# Patient Record
Sex: MALE | Race: WHITE | HISPANIC OR LATINO | Employment: OTHER | URBAN - METROPOLITAN AREA
[De-identification: names, ages, dates, MRNs, and addresses within clinical notes are randomized per-mention and may not be internally consistent; named-entity substitution may affect disease eponyms.]

---

## 2017-01-11 ENCOUNTER — HOSPITAL ENCOUNTER (INPATIENT)
Facility: HOSPITAL | Age: 82
LOS: 3 days | Discharge: HOSPICE | DRG: 291 | End: 2017-01-14
Attending: EMERGENCY MEDICINE | Admitting: INTERNAL MEDICINE
Payer: MEDICARE

## 2017-01-11 DIAGNOSIS — Z66 DNR (DO NOT RESUSCITATE): ICD-10-CM

## 2017-01-11 DIAGNOSIS — Z71.89 GOALS OF CARE, COUNSELING/DISCUSSION: ICD-10-CM

## 2017-01-11 DIAGNOSIS — J81.0 ACUTE PULMONARY EDEMA: ICD-10-CM

## 2017-01-11 DIAGNOSIS — I50.33 ACUTE ON CHRONIC DIASTOLIC CHF (CONGESTIVE HEART FAILURE): Primary | ICD-10-CM

## 2017-01-11 DIAGNOSIS — F01.518 VASCULAR DEMENTIA WITH BEHAVIOR DISTURBANCE: Chronic | ICD-10-CM

## 2017-01-11 DIAGNOSIS — I48.19 PERSISTENT ATRIAL FIBRILLATION: ICD-10-CM

## 2017-01-11 DIAGNOSIS — R60.0 BILATERAL LEG EDEMA: ICD-10-CM

## 2017-01-11 DIAGNOSIS — R68.0: ICD-10-CM

## 2017-01-11 LAB
ALBUMIN SERPL BCP-MCNC: 3.4 G/DL
ALP SERPL-CCNC: 83 U/L
ALT SERPL W/O P-5'-P-CCNC: 50 U/L
ANION GAP SERPL CALC-SCNC: 10 MMOL/L
AST SERPL-CCNC: 31 U/L
BASOPHILS # BLD AUTO: 0 K/UL
BASOPHILS NFR BLD: 0 %
BILIRUB SERPL-MCNC: 1 MG/DL
BNP SERPL-MCNC: 695 PG/ML
BUN SERPL-MCNC: 26 MG/DL
CALCIUM SERPL-MCNC: 8.8 MG/DL
CHLORIDE SERPL-SCNC: 104 MMOL/L
CO2 SERPL-SCNC: 27 MMOL/L
CREAT SERPL-MCNC: 0.9 MG/DL
DIFFERENTIAL METHOD: ABNORMAL
EOSINOPHIL # BLD AUTO: 0 K/UL
EOSINOPHIL NFR BLD: 0 %
ERYTHROCYTE [DISTWIDTH] IN BLOOD BY AUTOMATED COUNT: 16.7 %
EST. GFR  (AFRICAN AMERICAN): >60 ML/MIN/1.73 M^2
EST. GFR  (NON AFRICAN AMERICAN): >60 ML/MIN/1.73 M^2
GLUCOSE SERPL-MCNC: 151 MG/DL
HCT VFR BLD AUTO: 36.7 %
HGB BLD-MCNC: 11.7 G/DL
INR PPP: 1
LYMPHOCYTES # BLD AUTO: 0.5 K/UL
LYMPHOCYTES NFR BLD: 8 %
MCH RBC QN AUTO: 31.2 PG
MCHC RBC AUTO-ENTMCNC: 31.9 %
MCV RBC AUTO: 98 FL
MONOCYTES # BLD AUTO: 0.5 K/UL
MONOCYTES NFR BLD: 7.4 %
NEUTROPHILS # BLD AUTO: 5.6 K/UL
NEUTROPHILS NFR BLD: 84 %
PLATELET # BLD AUTO: 79 K/UL
PMV BLD AUTO: 10.9 FL
POTASSIUM SERPL-SCNC: 3.9 MMOL/L
PROT SERPL-MCNC: 6.3 G/DL
PROTHROMBIN TIME: 10.9 SEC
RBC # BLD AUTO: 3.75 M/UL
SODIUM SERPL-SCNC: 141 MMOL/L
TROPONIN I SERPL DL<=0.01 NG/ML-MCNC: 0.1 NG/ML
WBC # BLD AUTO: 6.61 K/UL

## 2017-01-11 PROCEDURE — 93005 ELECTROCARDIOGRAM TRACING: CPT

## 2017-01-11 PROCEDURE — 25000003 PHARM REV CODE 250: Performed by: INTERNAL MEDICINE

## 2017-01-11 PROCEDURE — 80053 COMPREHEN METABOLIC PANEL: CPT

## 2017-01-11 PROCEDURE — 51701 INSERT BLADDER CATHETER: CPT

## 2017-01-11 PROCEDURE — 84484 ASSAY OF TROPONIN QUANT: CPT

## 2017-01-11 PROCEDURE — 83880 ASSAY OF NATRIURETIC PEPTIDE: CPT

## 2017-01-11 PROCEDURE — 25000003 PHARM REV CODE 250: Performed by: EMERGENCY MEDICINE

## 2017-01-11 PROCEDURE — 87040 BLOOD CULTURE FOR BACTERIA: CPT | Mod: 59

## 2017-01-11 PROCEDURE — 63600175 PHARM REV CODE 636 W HCPCS: Performed by: INTERNAL MEDICINE

## 2017-01-11 PROCEDURE — 93010 ELECTROCARDIOGRAM REPORT: CPT | Mod: 76,,, | Performed by: INTERNAL MEDICINE

## 2017-01-11 PROCEDURE — 94760 N-INVAS EAR/PLS OXIMETRY 1: CPT

## 2017-01-11 PROCEDURE — 11000001 HC ACUTE MED/SURG PRIVATE ROOM

## 2017-01-11 PROCEDURE — 99291 CRITICAL CARE FIRST HOUR: CPT | Mod: ,,, | Performed by: EMERGENCY MEDICINE

## 2017-01-11 PROCEDURE — 99291 CRITICAL CARE FIRST HOUR: CPT | Mod: 25

## 2017-01-11 PROCEDURE — 94640 AIRWAY INHALATION TREATMENT: CPT

## 2017-01-11 PROCEDURE — 85610 PROTHROMBIN TIME: CPT

## 2017-01-11 PROCEDURE — 85025 COMPLETE CBC W/AUTO DIFF WBC: CPT

## 2017-01-11 PROCEDURE — 63600175 PHARM REV CODE 636 W HCPCS: Performed by: EMERGENCY MEDICINE

## 2017-01-11 PROCEDURE — 93010 ELECTROCARDIOGRAM REPORT: CPT | Mod: ,,, | Performed by: INTERNAL MEDICINE

## 2017-01-11 PROCEDURE — 99223 1ST HOSP IP/OBS HIGH 75: CPT | Mod: ,,, | Performed by: INTERNAL MEDICINE

## 2017-01-11 PROCEDURE — 96374 THER/PROPH/DIAG INJ IV PUSH: CPT

## 2017-01-11 RX ORDER — IPRATROPIUM BROMIDE 0.5 MG/2.5ML
0.5 SOLUTION RESPIRATORY (INHALATION) EVERY 8 HOURS
Status: DISCONTINUED | OUTPATIENT
Start: 2017-01-11 | End: 2017-01-16 | Stop reason: HOSPADM

## 2017-01-11 RX ORDER — IPRATROPIUM BROMIDE AND ALBUTEROL SULFATE 2.5; .5 MG/3ML; MG/3ML
3 SOLUTION RESPIRATORY (INHALATION) EVERY 4 HOURS
Status: DISCONTINUED | OUTPATIENT
Start: 2017-01-11 | End: 2017-01-11

## 2017-01-11 RX ORDER — HYDROCODONE BITARTRATE AND ACETAMINOPHEN 7.5; 325 MG/1; MG/1
1 TABLET ORAL EVERY 4 HOURS PRN
Status: DISCONTINUED | OUTPATIENT
Start: 2017-01-11 | End: 2017-01-16 | Stop reason: HOSPADM

## 2017-01-11 RX ORDER — FUROSEMIDE 10 MG/ML
60 INJECTION INTRAMUSCULAR; INTRAVENOUS
Status: COMPLETED | OUTPATIENT
Start: 2017-01-11 | End: 2017-01-11

## 2017-01-11 RX ORDER — SODIUM CHLORIDE 0.9 % (FLUSH) 0.9 %
3 SYRINGE (ML) INJECTION EVERY 8 HOURS
Status: DISCONTINUED | OUTPATIENT
Start: 2017-01-11 | End: 2017-01-16 | Stop reason: HOSPADM

## 2017-01-11 RX ORDER — LEVALBUTEROL INHALATION SOLUTION 0.63 MG/3ML
0.63 SOLUTION RESPIRATORY (INHALATION) EVERY 8 HOURS
Status: DISCONTINUED | OUTPATIENT
Start: 2017-01-11 | End: 2017-01-16 | Stop reason: HOSPADM

## 2017-01-11 RX ORDER — FUROSEMIDE 10 MG/ML
40 INJECTION INTRAMUSCULAR; INTRAVENOUS 2 TIMES DAILY
Status: DISCONTINUED | OUTPATIENT
Start: 2017-01-11 | End: 2017-01-12

## 2017-01-11 RX ORDER — PANTOPRAZOLE SODIUM 40 MG/1
40 TABLET, DELAYED RELEASE ORAL
Status: DISCONTINUED | OUTPATIENT
Start: 2017-01-11 | End: 2017-01-13

## 2017-01-11 RX ORDER — ENOXAPARIN SODIUM 100 MG/ML
40 INJECTION SUBCUTANEOUS EVERY 24 HOURS
Status: DISCONTINUED | OUTPATIENT
Start: 2017-01-12 | End: 2017-01-16 | Stop reason: HOSPADM

## 2017-01-11 RX ORDER — DONEPEZIL HYDROCHLORIDE 5 MG/1
10 TABLET, FILM COATED ORAL NIGHTLY
Status: DISCONTINUED | OUTPATIENT
Start: 2017-01-11 | End: 2017-01-16 | Stop reason: HOSPADM

## 2017-01-11 RX ORDER — NAPROXEN SODIUM 220 MG/1
81 TABLET, FILM COATED ORAL DAILY
Status: DISCONTINUED | OUTPATIENT
Start: 2017-01-12 | End: 2017-01-16 | Stop reason: HOSPADM

## 2017-01-11 RX ORDER — NITROGLYCERIN 0.4 MG/1
0.4 TABLET SUBLINGUAL ONCE
Status: COMPLETED | OUTPATIENT
Start: 2017-01-11 | End: 2017-01-11

## 2017-01-11 RX ORDER — CARVEDILOL 3.12 MG/1
6.25 TABLET ORAL 2 TIMES DAILY
Status: DISCONTINUED | OUTPATIENT
Start: 2017-01-11 | End: 2017-01-12

## 2017-01-11 RX ADMIN — MICONAZOLE NITRATE: 20 OINTMENT TOPICAL at 10:01

## 2017-01-11 RX ADMIN — LEVALBUTEROL HYDROCHLORIDE 0.63 MG: 0.63 SOLUTION RESPIRATORY (INHALATION) at 07:01

## 2017-01-11 RX ADMIN — IPRATROPIUM BROMIDE 0.5 MG: 0.5 SOLUTION RESPIRATORY (INHALATION) at 07:01

## 2017-01-11 RX ADMIN — PANTOPRAZOLE SODIUM 40 MG: 40 TABLET, DELAYED RELEASE ORAL at 07:01

## 2017-01-11 RX ADMIN — NITROGLYCERIN 0.4 MG: 0.4 TABLET SUBLINGUAL at 07:01

## 2017-01-11 RX ADMIN — FUROSEMIDE 40 MG: 10 INJECTION, SOLUTION INTRAVENOUS at 10:01

## 2017-01-11 RX ADMIN — FUROSEMIDE 60 MG: 10 INJECTION, SOLUTION INTRAMUSCULAR; INTRAVENOUS at 04:01

## 2017-01-11 RX ADMIN — DONEPEZIL HYDROCHLORIDE 10 MG: 5 TABLET, FILM COATED ORAL at 10:01

## 2017-01-11 RX ADMIN — Medication 3 ML: at 10:01

## 2017-01-11 RX ADMIN — CARVEDILOL 6.25 MG: 3.12 TABLET, FILM COATED ORAL at 07:01

## 2017-01-11 NOTE — ED PROVIDER NOTES
Encounter Date: 1/11/2017    SCRIBE #1 NOTE: I, Kassi Felton, am scribing for, and in the presence of, Dr. Raya.       History     Chief Complaint   Patient presents with    edema     edema to bilateral legs with weeping; from [facility]; hx CHF; rales noted upon auscultation; initial sats 90%on RA     Review of patient's allergies indicates:   Allergen Reactions    Pcn [penicillins] Hives and Other (See Comments)     unknow     HPI Comments: Time patient was seen by the provider: 3:32 PM      The patient is a 89 y.o. male with hx of: HTN, prostate cancer that presents to the ED with a complaint of AMS and edema. Pt with extremity swelling and cough. Pt speaks limited English and is confused making history difficult in spite of taking the history with a .  He was sent here from the nursing home for increased lower extremity edema as well as some confusion.  A review of his medical record shows that the confusion is a similar presentation to his previous admission.        The history is provided by the patient and medical records. The history is limited by a language barrier and the condition of the patient. A  was used.     Past Medical History   Diagnosis Date    Basal cell carcinoma 04/20/2016     Scalp    DNR (do not resuscitate)     Hypertension     Hypothyroidism     Normocytic anemia 11/12/2016    Prostate cancer      Past Medical History Pertinent Negatives   Diagnosis Date Noted    Asthma 8/27/2012    Chronic kidney disease 8/27/2012    Clotting disorder 8/27/2012    Colon polyp 8/27/2012    COPD (chronic obstructive pulmonary disease) 8/27/2012    Coronary artery disease 8/27/2012    Diabetes mellitus 8/27/2012    Elevated PSA 8/27/2012    Glaucoma 8/27/2012    Kidney stone 8/27/2012    Liver disease 8/27/2012    Stroke 8/27/2012    Ulcer 8/27/2012    Urinary tract infection 8/27/2012     Past Surgical History   Procedure Laterality  Date    Prostate surgery      Prostatectomy      Hernia repair       X 2    Cataract extraction, bilateral      Skin cancer excision       EAR RIGHT, LEFT JAW, AND RIGHT ARM    Back surgery       COCCYX AS  A CHILD     Family History   Problem Relation Age of Onset    Skin cancer Father     Stroke Sister 89    Anesthesia problems Neg Hx     Cancer Neg Hx     Clotting disorder Neg Hx      problems Neg Hx     Heart disease Neg Hx     Hypertension Neg Hx     Kidney cancer Neg Hx     Kidney disease Neg Hx     Malignant hyperthermia Neg Hx     Prostate cancer Neg Hx     Sickle cell trait Neg Hx     Lupus Neg Hx     Sudden death Neg Hx     Urolithiasis Neg Hx     Melanoma Neg Hx      Social History   Substance Use Topics    Smoking status: Never Smoker    Smokeless tobacco: Never Used    Alcohol use No     Review of Systems   Unable to perform ROS: Mental status change   Respiratory: Positive for cough.    Cardiovascular: Positive for leg swelling (+upper extremities).       Physical Exam   Initial Vitals   BP Pulse Resp Temp SpO2   01/11/17 1448 01/11/17 1448 01/11/17 1448 01/11/17 1448 01/11/17 1448   156/80 75 20 97.1 °F (36.2 °C) 100 %     Physical Exam    Nursing note and vitals reviewed.  Constitutional: He appears well-developed. No distress.   Audible coarse BS. Wet sounding cough. Moderate respiratory      HENT:   Mouth/Throat: Oropharynx is clear and moist.   Eyes: EOM are normal. Pupils are equal, round, and reactive to light.   Neck: Normal range of motion. Neck supple. JVD (to the angle of the mandible) present.   Cardiovascular:   Irregularly irregular   Pulmonary/Chest: No stridor. He is in respiratory distress (moderate).   Coarse expiratory rales 3/4 of the way up bilaterally   Abdominal: Soft. There is no tenderness.   Musculoskeletal:   4+ pitting edema to the thighs bilaterally. RLE is wrapped in gauze and there is some weeping of serous fluid through the gauze. LUE edema    Neurological:   Pt appears confused, had difficulty answering in linear fashion and follows some commands. Pt at times speaking  Syriac and English, though we attempted to obtain hx through a . At one point he appeared to be speaking about poetry   Skin: Skin is warm.         ED Course   Procedures  Labs Reviewed   CBC W/ AUTO DIFFERENTIAL - Abnormal; Notable for the following:        Result Value    RBC 3.75 (*)     Hemoglobin 11.7 (*)     Hematocrit 36.7 (*)     MCH 31.2 (*)     MCHC 31.9 (*)     RDW 16.7 (*)     Platelets 79 (*)     Lymph # 0.5 (*)     Gran% 84.0 (*)     Lymph% 8.0 (*)     All other components within normal limits   COMPREHENSIVE METABOLIC PANEL - Abnormal; Notable for the following:     Glucose 151 (*)     Albumin 3.4 (*)     ALT 50 (*)     All other components within normal limits   TROPONIN I - Abnormal; Notable for the following:     Troponin I 0.097 (*)     All other components within normal limits   B-TYPE NATRIURETIC PEPTIDE - Abnormal; Notable for the following:      (*)     All other components within normal limits   CULTURE, BLOOD   CULTURE, BLOOD   PROTIME-INR     EKG Readings: (Independently Interpreted)   afib at 71. RBBB. No significant change when compared to EKG from December 8,2016       X-Rays:   Independently Interpreted Readings:   Chest X-Ray: Cardiomegaly. Bibasilar infiltrates vs pulmonary edema     Medical Decision Making:   History:   Old Medical Records: I decided to obtain old medical records.  Old Records Summarized: records from previous admission(s).       <> Summary of Records: Pt discharged after he was admitted 12/8-12/12 for PNA and presented with AMS. At that time he also had acute on chronic diastolic CHF. Pt was treated with IV lasix, ceftriaxone and azithromycin.  Initial Assessment:   The pt is a 89 y.o.  male  that presents from nursing home for AMS and swelling. Pt clinically appears volume overloaded. Recent admission for PNA. My  initial differential diagnoses include, but are not limited to: Pulmonary edema, CHF exacerbation, PNA. Will obtain labs, blood cultures, CXR. Plan for admission to the hospital.            Scribe Attestation:   Scribe #1: I performed the above scribed service and the documentation accurately describes the services I performed. I attest to the accuracy of the note.    Attending Attestation:         Attending Critical Care:   Critical Care Times:   Direct Patient Care (initial evaluation, reassessments, and time considering the case)................................................................20 minutes.   Additional History from reviewing old medical records or taking additional history from the family, EMS, PCP, etc.......................8 minutes.   Ordering, Reviewing, and Interpreting Diagnostic Studies...............................................................................................................5 minutes.   Documentation..................................................................................................................................................................................5 minutes.   Consultation with other Physicians. .................................................................................................................................................5 minutes.   Consultation with the patient's family directly relating to the patient's condition, care, and DNR status (when patient unable)......5 minutes.   ==============================================================  · Total Critical Care Time - exclusive of procedural time: 48 minutes.  ==============================================================  Critical Care Condition: potentially life-threatening   Critical Care Comments: Critical care time required in this patient who presented in respiratory distress from CHF; patient had the potential for deterioration in his condition at any time.     Physician  Attestation for Scribe:  Physician Attestation Statement for Scribe #1: I, Dr. Raya, reviewed documentation, as scribed by Kassi Felton in my presence, and it is both accurate and complete.         Attending ED Notes:   4:30 PM  I attempted to contact pt's emergency contact (daughter Irene) to gather more information/ inquire about code status, however she did not answer and her voicemail was full. Will attempt to contact nursing home.    4:40 PM  Per nursing home, pt alternates between English and Tajik at baseline and is usually alert and oriented. Nursing home states that pt has DNR order on his chart that was signed in November 2016.     4:55 PM  I spoke with the pt's daughter (Irene Marquez [phone]). She confirmed the pt's DNR status.    6:15 PM  Discussed case with [facility], Dr. Zhou, and patient will be admitted to Dr. Yanes's service.  Patient is breathing more easily now.  He was given 60 mg of Lasix IV.  Condom catheter in place.            ED Course     Clinical Impression:   The primary encounter diagnosis was Acute pulmonary edema. A diagnosis of Acute on chronic diastolic CHF (congestive heart failure) was also pertinent to this visit.          Nati Romano MD  01/11/17 1817       Nati Romano MD  01/11/17 1829

## 2017-01-11 NOTE — ED TRIAGE NOTES
Pt arrives via ems for ams and sob. Pt aao to self and place. Pt in mild resp distress. Pt has weeping edema noted to ble and edema to hands. Pt has no other complaints. Denies cough, chest pain.

## 2017-01-11 NOTE — ED NOTES
Pt resting comfortably and independently repositioned in stretcher with bed locked in lowest position for safety. NAD noted at this time. Respirations even and unlabored and visible chest rise noted.  Pt instructed to call if assistance is needed. Pt on continuous cardiac, BP, and O2 monitoring. Call light within reach. Family at bedside. No needs at this time. Will continue to monitor.

## 2017-01-11 NOTE — ED NOTES
Patient identifiers verified and correct for Hardik Marquez.    LOC: The patient is awake, alert and aware of environment with an appropriate affect, the patient is oriented x 2 and speaking appropriately.  APPEARANCE: Patient resting comfortably and in no acute distress, patient is clean and well groomed, patient's clothing is properly fastened.  SKIN: The skin is warm and dry, color consistent with ethnicity, patient has normal skin turgor and moist mucus membranes, skin intact, no breakdown or bruising noted.  MUSCULOSKELETAL: Patient moving all extremities spontaneously, no obvious swelling or deformities noted.  RESPIRATORY: Airway is open and patent, respirations are spontaneous, patient tachypneic, some accessory muscle use noted, rales bilateral breath sounds.  CARDIAC: Patient has a normal rate and regular rhythm, weeping periphreal edema noted to ble, capillary refill < 3 seconds.  ABDOMEN: Soft and non tender to palpation, no distention noted, normoactive bowel sounds present in all four quadrants.  NEUROLOGIC: PERRL, 3mm bilaterally, eyes open spontaneously, behavior appropriate to situation, follows commands, facial expression symmetrical, bilateral hand grasp equal and even, purposeful motor response noted, normal sensation in all extremities when touched with a finger.

## 2017-01-12 LAB
ALBUMIN SERPL BCP-MCNC: 3.1 G/DL
ALLENS TEST: ABNORMAL
ALP SERPL-CCNC: 75 U/L
ALT SERPL W/O P-5'-P-CCNC: 44 U/L
ANION GAP SERPL CALC-SCNC: 12 MMOL/L
AST SERPL-CCNC: 27 U/L
BASOPHILS # BLD AUTO: 0.01 K/UL
BASOPHILS NFR BLD: 0.2 %
BILIRUB SERPL-MCNC: 1 MG/DL
BUN SERPL-MCNC: 24 MG/DL
CALCIUM SERPL-MCNC: 8.5 MG/DL
CHLORIDE SERPL-SCNC: 100 MMOL/L
CO2 SERPL-SCNC: 33 MMOL/L
CREAT SERPL-MCNC: 0.8 MG/DL
DELSYS: ABNORMAL
DIFFERENTIAL METHOD: ABNORMAL
EOSINOPHIL # BLD AUTO: 0 K/UL
EOSINOPHIL NFR BLD: 0 %
EP: 5
ERYTHROCYTE [DISTWIDTH] IN BLOOD BY AUTOMATED COUNT: 16.6 %
ERYTHROCYTE [SEDIMENTATION RATE] IN BLOOD BY WESTERGREN METHOD: 19 MM/H
EST. GFR  (AFRICAN AMERICAN): >60 ML/MIN/1.73 M^2
EST. GFR  (NON AFRICAN AMERICAN): >60 ML/MIN/1.73 M^2
FIO2: 35
GLUCOSE SERPL-MCNC: 133 MG/DL
HCO3 UR-SCNC: 36.9 MMOL/L (ref 24–28)
HCT VFR BLD AUTO: 36.3 %
HGB BLD-MCNC: 11.3 G/DL
IP: 12
LACTATE SERPL-SCNC: 1 MMOL/L
LYMPHOCYTES # BLD AUTO: 0.5 K/UL
LYMPHOCYTES NFR BLD: 8.1 %
MAGNESIUM SERPL-MCNC: 1.9 MG/DL
MCH RBC QN AUTO: 30.7 PG
MCHC RBC AUTO-ENTMCNC: 31.1 %
MCV RBC AUTO: 99 FL
MIN VOL: 6
MODE: ABNORMAL
MONOCYTES # BLD AUTO: 0.4 K/UL
MONOCYTES NFR BLD: 6.5 %
NEUTROPHILS # BLD AUTO: 5.3 K/UL
NEUTROPHILS NFR BLD: 84.7 %
PCO2 BLDA: 71.9 MMHG (ref 35–45)
PH SMN: 7.32 [PH] (ref 7.35–7.45)
PLATELET # BLD AUTO: 76 K/UL
PMV BLD AUTO: 11 FL
PO2 BLDA: 134 MMHG (ref 80–100)
POC BE: 11 MMOL/L
POC SATURATED O2: 99 % (ref 95–100)
POC TCO2: 39 MMOL/L (ref 23–27)
POTASSIUM SERPL-SCNC: 3.4 MMOL/L
PROCALCITONIN SERPL IA-MCNC: <0.09 NG/ML
PROT SERPL-MCNC: 5.8 G/DL
RBC # BLD AUTO: 3.68 M/UL
SAMPLE: ABNORMAL
SITE: ABNORMAL
SODIUM SERPL-SCNC: 145 MMOL/L
SP02: 99
SPONT RATE: 9
TROPONIN I SERPL DL<=0.01 NG/ML-MCNC: 0.1 NG/ML
WBC # BLD AUTO: 6.3 K/UL

## 2017-01-12 PROCEDURE — 93005 ELECTROCARDIOGRAM TRACING: CPT

## 2017-01-12 PROCEDURE — 63600175 PHARM REV CODE 636 W HCPCS: Performed by: CLINICAL NURSE SPECIALIST

## 2017-01-12 PROCEDURE — 87040 BLOOD CULTURE FOR BACTERIA: CPT | Mod: 59

## 2017-01-12 PROCEDURE — 36600 WITHDRAWAL OF ARTERIAL BLOOD: CPT

## 2017-01-12 PROCEDURE — 27000221 HC OXYGEN, UP TO 24 HOURS

## 2017-01-12 PROCEDURE — 94640 AIRWAY INHALATION TREATMENT: CPT

## 2017-01-12 PROCEDURE — 84145 PROCALCITONIN (PCT): CPT

## 2017-01-12 PROCEDURE — 84484 ASSAY OF TROPONIN QUANT: CPT

## 2017-01-12 PROCEDURE — 80053 COMPREHEN METABOLIC PANEL: CPT

## 2017-01-12 PROCEDURE — 63600175 PHARM REV CODE 636 W HCPCS: Performed by: INTERNAL MEDICINE

## 2017-01-12 PROCEDURE — 11000001 HC ACUTE MED/SURG PRIVATE ROOM

## 2017-01-12 PROCEDURE — 94761 N-INVAS EAR/PLS OXIMETRY MLT: CPT

## 2017-01-12 PROCEDURE — 36415 COLL VENOUS BLD VENIPUNCTURE: CPT

## 2017-01-12 PROCEDURE — 99233 SBSQ HOSP IP/OBS HIGH 50: CPT | Mod: ,,, | Performed by: NURSE PRACTITIONER

## 2017-01-12 PROCEDURE — 83605 ASSAY OF LACTIC ACID: CPT

## 2017-01-12 PROCEDURE — 93010 ELECTROCARDIOGRAM REPORT: CPT | Mod: ,,, | Performed by: INTERNAL MEDICINE

## 2017-01-12 PROCEDURE — 94660 CPAP INITIATION&MGMT: CPT

## 2017-01-12 PROCEDURE — 85025 COMPLETE CBC W/AUTO DIFF WBC: CPT

## 2017-01-12 PROCEDURE — 25000003 PHARM REV CODE 250: Performed by: NURSE PRACTITIONER

## 2017-01-12 PROCEDURE — 25000003 PHARM REV CODE 250: Performed by: INTERNAL MEDICINE

## 2017-01-12 PROCEDURE — 82803 BLOOD GASES ANY COMBINATION: CPT

## 2017-01-12 PROCEDURE — 63600175 PHARM REV CODE 636 W HCPCS: Performed by: NURSE PRACTITIONER

## 2017-01-12 PROCEDURE — 83735 ASSAY OF MAGNESIUM: CPT

## 2017-01-12 PROCEDURE — 94760 N-INVAS EAR/PLS OXIMETRY 1: CPT

## 2017-01-12 RX ORDER — FUROSEMIDE 10 MG/ML
60 INJECTION INTRAMUSCULAR; INTRAVENOUS ONCE
Status: COMPLETED | OUTPATIENT
Start: 2017-01-12 | End: 2017-01-12

## 2017-01-12 RX ORDER — FUROSEMIDE 10 MG/ML
60 INJECTION INTRAMUSCULAR; INTRAVENOUS 3 TIMES DAILY
Status: DISCONTINUED | OUTPATIENT
Start: 2017-01-12 | End: 2017-01-16

## 2017-01-12 RX ORDER — CARVEDILOL 3.12 MG/1
3.12 TABLET ORAL 2 TIMES DAILY
Status: DISCONTINUED | OUTPATIENT
Start: 2017-01-12 | End: 2017-01-12

## 2017-01-12 RX ORDER — METRONIDAZOLE 500 MG/100ML
500 INJECTION, SOLUTION INTRAVENOUS
Status: DISCONTINUED | OUTPATIENT
Start: 2017-01-12 | End: 2017-01-16 | Stop reason: HOSPADM

## 2017-01-12 RX ORDER — FUROSEMIDE 10 MG/ML
40 INJECTION INTRAMUSCULAR; INTRAVENOUS 3 TIMES DAILY
Status: DISCONTINUED | OUTPATIENT
Start: 2017-01-12 | End: 2017-01-12

## 2017-01-12 RX ORDER — POTASSIUM CHLORIDE 20 MEQ/15ML
60 SOLUTION ORAL DAILY
Status: DISCONTINUED | OUTPATIENT
Start: 2017-01-12 | End: 2017-01-13

## 2017-01-12 RX ADMIN — IPRATROPIUM BROMIDE 0.5 MG: 0.5 SOLUTION RESPIRATORY (INHALATION) at 12:01

## 2017-01-12 RX ADMIN — LEVALBUTEROL HYDROCHLORIDE 0.63 MG: 0.63 SOLUTION RESPIRATORY (INHALATION) at 11:01

## 2017-01-12 RX ADMIN — ENOXAPARIN SODIUM 40 MG: 100 INJECTION SUBCUTANEOUS at 12:01

## 2017-01-12 RX ADMIN — VANCOMYCIN HYDROCHLORIDE 1000 MG: 1 INJECTION, POWDER, LYOPHILIZED, FOR SOLUTION INTRAVENOUS at 03:01

## 2017-01-12 RX ADMIN — FUROSEMIDE 60 MG: 10 INJECTION, SOLUTION INTRAVENOUS at 09:01

## 2017-01-12 RX ADMIN — CEFTRIAXONE 1 G: 1 INJECTION, SOLUTION INTRAVENOUS at 01:01

## 2017-01-12 RX ADMIN — FUROSEMIDE 40 MG: 10 INJECTION, SOLUTION INTRAVENOUS at 12:01

## 2017-01-12 RX ADMIN — LEVALBUTEROL HYDROCHLORIDE 0.63 MG: 0.63 SOLUTION RESPIRATORY (INHALATION) at 09:01

## 2017-01-12 RX ADMIN — Medication 3 ML: at 06:01

## 2017-01-12 RX ADMIN — LEVALBUTEROL HYDROCHLORIDE 0.63 MG: 0.63 SOLUTION RESPIRATORY (INHALATION) at 05:01

## 2017-01-12 RX ADMIN — MICONAZOLE NITRATE: 20 OINTMENT TOPICAL at 09:01

## 2017-01-12 RX ADMIN — Medication 3 ML: at 03:01

## 2017-01-12 RX ADMIN — METRONIDAZOLE 500 MG: 500 INJECTION, SOLUTION INTRAVENOUS at 02:01

## 2017-01-12 RX ADMIN — IPRATROPIUM BROMIDE 0.5 MG: 0.5 SOLUTION RESPIRATORY (INHALATION) at 11:01

## 2017-01-12 RX ADMIN — IPRATROPIUM BROMIDE 0.5 MG: 0.5 SOLUTION RESPIRATORY (INHALATION) at 05:01

## 2017-01-12 RX ADMIN — IPRATROPIUM BROMIDE 0.5 MG: 0.5 SOLUTION RESPIRATORY (INHALATION) at 09:01

## 2017-01-12 RX ADMIN — Medication 3 ML: at 10:01

## 2017-01-12 RX ADMIN — METRONIDAZOLE 500 MG: 500 INJECTION, SOLUTION INTRAVENOUS at 08:01

## 2017-01-12 RX ADMIN — MICONAZOLE NITRATE: 20 OINTMENT TOPICAL at 01:01

## 2017-01-12 NOTE — PROGRESS NOTES
Pt is resident of [facility]. Not appropriate for Digital Medicine HF program.    Removed from HF list.

## 2017-01-12 NOTE — TREATMENT PLAN
Occupational Therapy   Pt Not Seen    Hardik Marquez  MRN: [id number]  Room/Bed: 1102/1102 B    Pt not seen by OT this AM secondary to nurse requesting therapy hold on seeing Pt  Due   to CORE being called on Pt this AM.  Will check back at later time to see if Pt is appropriate   To Eval.    Jonnie Burks, OTR/L  1/12/2017

## 2017-01-12 NOTE — SIGNIFICANT EVENT
Critical Care Outreach (CORE)  Critical Care Medicine  Consult Note      CORE Metrics:     Admit Date: 2017  LOS: 1  Code Status: DNR   CC:  Respiratory Failure  Date of Consult: 2017  : 1923  Age: 89 y.o.  Weight:   Wt Readings from Last 1 Encounters:   17 66 kg (145 lb 8.1 oz)     Race:   Sex: male  Bed: 1102/1102 B:   MRN: [id number]  RRT Indication(s):  Respiratory Distress  Time CORE Call Received:  08:50  Time CORE at Bedside: 08:55  Was the patient discharged from an ICU this admission? N  Was the patient discharged from a PACU within last 24 hours? N  Did the patient receive conscious sedation/general anesthesia within last 24 hours? N  Was the patient in the ED within the past 24 hours? Y  Was the patient started on NIPPV within the past 24 hours? Y  Did this progress into an ARC or CPA: N  Attending Physician: Jarett Yanes MD  Primary Service: Networked reference to record PCT   Illness Category: Medical Cardiac  Consult Requested By: Jarett Yanes MD   Disposition:  Stabilized on the floor    SUBJECTIVE:     History of Present Illness:  Mr Marquez is a 89 y.o. male with a PMH of HFpEF, HTN, and hypothyroidism presented to ED from his Nursing Home on 2017 with bilateral lower extremity edema and respiratory distress. He was treated with Lasix with good diuresis.    Today a Rapid Response call was initiated for respiratory distress. On arrival to the room the patient was lethargic, tachypneic, hypothermic, and bradycardic.       Past Medical History   Diagnosis Date    Basal cell carcinoma 2016     Scalp    DNR (do not resuscitate)     Hypertension     Hypothyroidism     Normocytic anemia 2016    Prostate cancer       Past Surgical History   Procedure Laterality Date    Prostate surgery      Prostatectomy      Hernia repair       X 2    Cataract extraction, bilateral      Skin cancer excision       EAR RIGHT, LEFT JAW, AND RIGHT ARM     Back surgery       COCCYX AS  A CHILD      Review of patient's allergies indicates:   Allergen Reactions    Pcn [penicillins] Hives and Other (See Comments)     unknow       Family History   Problem Relation Age of Onset    Skin cancer Father     Stroke Sister 93    Anesthesia problems Neg Hx     Cancer Neg Hx     Clotting disorder Neg Hx      problems Neg Hx     Heart disease Neg Hx     Hypertension Neg Hx     Kidney cancer Neg Hx     Kidney disease Neg Hx     Malignant hyperthermia Neg Hx     Prostate cancer Neg Hx     Sickle cell trait Neg Hx     Lupus Neg Hx     Sudden death Neg Hx     Urolithiasis Neg Hx     Melanoma Neg Hx       Social History   Substance Use Topics    Smoking status: Never Smoker    Smokeless tobacco: Never Used    Alcohol use No         Inpatient Medications:  Continuous Infusions:    Scheduled Meds:   aspirin  81 mg Oral Daily    carvedilol  3.125 mg Oral BID    donepezil  10 mg Oral QHS    enoxaparin  40 mg Subcutaneous Daily    furosemide  40 mg Intravenous TID    ipratropium  0.5 mg Nebulization Q8H    levalbuterol  0.63 mg Nebulization Q8H    levothyroxine  125 mcg Oral Daily    miconazole nitrate 2%   Topical (Top) BID    pantoprazole  40 mg Oral BID AC    potassium chloride 10%  60 mEq Oral Daily    sodium chloride 0.9%  3 mL Intravenous Q8H      PRN Meds:.hydrocodone-acetaminophen 7.5-325mg     Review of Systems: RITCHIE due to AMS    OBJECTIVE:     Vital Signs (Most Recent):  Temp: (!) 93.7 °F (34.3 °C) (01/12/17 0844)  Pulse: (!) 58 (01/12/17 0819)  Resp: 18 (01/12/17 0819)  BP: (!) 169/99 (01/12/17 0819)  SpO2: 100 % (01/12/17 0819) Vital Signs (24h Range):  Temp:  [93.1 °F (33.9 °C)-98 °F (36.7 °C)] 93.7 °F (34.3 °C)  Pulse:  [46-75] 58  Resp:  [18-35] 18  BP: (116-169)/(60-99) 169/99     I & O (24h):    Intake/Output Summary (Last 24 hours) at 01/12/17 0909  Last data filed at 01/12/17 0400   Gross per 24 hour   Intake                0 ml    Output             2800 ml   Net            -2800 ml        Physical Exam:  GA: Acute respiratory distress   HEENT: No visible oropharyngeal abnormalities.   Pulmonary: Crackles, wheezing, and  rhonchi bilaterally  Cardiac: Irregular, bradycardic.   Abdominal: Bowel sounds present. Abdomen soft  Skin: No jaundice, rashes, or visible lesions.  Musculoskeletal: Moves all extremities   Neuro:  --GCS: E3 V4 M6  --Mental Status:  Listless  --RASS: 0 to -1  --CAM-ICU: RITCHIE  --Pupils 3mm, PERRL.    Lines/Drains/Airway:          External Urinary Catheter 01/11/17 1600 (Active)   Collection Container Standard drainage bag 1/11/2017 10:50 PM   Securement Method secured to upper leg w/ adhesive device 1/11/2017 10:50 PM   Skin no redness;no breakdown 1/11/2017 10:50 PM   Tolerance no signs/symptoms of discomfort 1/11/2017 10:50 PM   Output (mL) 2800 mL 1/12/2017  4:00 AM        Nutrition:  Current Diet Order   Procedures    Diet NPO         Labs:  CBC/Anemia Profile:     Recent Labs  Lab 01/11/17  1550 01/12/17  0447   WBC 6.61 6.30   HGB 11.7* 11.3*   HCT 36.7* 36.3*   PLT 79* 76*   MCV 98 99*   RDW 16.7* 16.6*        Coags:     Recent Labs  Lab 01/11/17  1550   INR 1.0        Chemistries:     Recent Labs  Lab 01/11/17  1550 01/12/17  0447    145   K 3.9 3.4*    100   CO2 27 33*   BUN 26 24   CREATININE 0.9 0.8   CALCIUM 8.8 8.5*   PROT 6.3 5.8*   BILITOT 1.0 1.0   ALKPHOS 83 75   ALT 50* 44   AST 31 27   MG  --  1.9        Urine Studies:   Lab Results   Component Value Date    COLORU Yellow 12/08/2016    APPEARANCEUA Clear 12/08/2016    PHUR 5.0 12/08/2016    SPECGRAV 1.010 12/08/2016    PROTEINUA Negative 12/08/2016    GLUCUA Negative 12/08/2016    KETONESU Negative 12/08/2016    BILIRUBINUA Negative 12/08/2016    OCCULTUA Negative 12/08/2016    NITRITE Negative 12/08/2016    UROBILINOGEN Negative 12/08/2016    LEUKOCYTESUR Negative 12/08/2016    RBCUA 2 01/29/2015    WBCUA 1 01/29/2015    BACTERIA Rare  01/29/2015    SQUAMEPITHEL <1 03/24/2011    HYALINECASTS 1 01/29/2015        Lactic Acid:   Lab Results   Component Value Date    LACTATE 1.1 12/08/2016    LACTATE 1.8 11/11/2016        Cardiac Enzymes:   Recent Labs      01/11/17   1550   TROPONINI  0.097*        Arterial Blood Gas:   No results for input(s): PH, PCO2, HCO3, POCSATURATED, BE in the last 24 hours.    Invalid input(s):  PO2     Microbiology Results (last 7 days)     Procedure Component Value Units Date/Time    Blood Culture #1 **CANNOT BE ORDERED STAT** [[id number]] Collected:  01/11/17 1545    Order Status:  Completed Specimen:  Blood from Peripheral, Forearm, Right Updated:  01/12/17 0315     Blood Culture, Routine No Growth to date    Blood Culture #2 **CANNOT BE ORDERED STAT** [[id number]] Collected:  01/11/17 1550    Order Status:  Completed Specimen:  Blood from Peripheral, Wrist, Left Updated:  01/12/17 0315     Blood Culture, Routine No Growth to date          ASSESSMENT/PLAN:     Active Hospital Problems    Diagnosis    *Acute on chronic diastolic CHF (congestive heart failure)    Acute pulmonary edema    DNR (do not resuscitate)    Persistent atrial fibrillation    Bilateral leg edema    Essential hypertension    Hypothyroidism        Acute Respiratory Failure/CHF  Lasix 60mg IV Stat (diuresed -2800 since admission)  -- Strict urine output  BIPAP 12/5  -- ABG- Hypercapnia  -- Duoneb stat  EKG- Afib with slow ventricular response   --- HR 40s (Pacer pads placed) >> possibly due to hypothermia   Troponin 0.097  on admission >> will repeat  Hypothermia 92.8 degrees - Mary Beth Hugger placed  -- Consider holding BBlocker    Suspected Aspiration Pneumonia  Stat CXR  Pan cx  Consider antibiotics  Swallow eval    Call to family to ask them to come in and see the patient. Goals of care discussion recommended.  Spoke with Dr Yanes to update him Rapid Response event. Nurses comfortable with the current plan of care       Uninterrupted Critical  Care/Counseling Time (not including procedures):  40mins  Fiona Winterbottom APRN, CNS  Critical Care Medicine  [phone]

## 2017-01-12 NOTE — PROGRESS NOTES
Responded to Rapid Response called. Pt was placed on Bipap, see flowsheet for settings. ABG obtained. Aerosol treatment given in -line. sats 100% at this time

## 2017-01-12 NOTE — H&P
History & Physical  Cardiology      SUBJECTIVE:     History of Present Illness:  Patient is a 89 y.o. male presents with bilateral le edema and respiratory distress with audible crackles from his nursing home.  Upon assessment in his room he is aao x 3 but cannot communicate adequately due to his audible crackles and inability to speak in full sentences.  His troponin is 0.097 but is chronically elevated last 0.127.  Heis BNP is 695 where it was 8-900 prior.  He has bilateral le pitting edema to his abdomen and bibasilar crackles with coarse breath sounds throughout.    PMH sig for HFpEF, htn, hypothyroidism.      Review of patient's allergies indicates:   Allergen Reactions    Pcn [penicillins] Hives and Other (See Comments)     unknow       Past Medical History   Diagnosis Date    Basal cell carcinoma 04/20/2016     Scalp    DNR (do not resuscitate)     Hypertension     Hypothyroidism     Normocytic anemia 11/12/2016    Prostate cancer      Past Surgical History   Procedure Laterality Date    Prostate surgery      Prostatectomy      Hernia repair       X 2    Cataract extraction, bilateral      Skin cancer excision       EAR RIGHT, LEFT JAW, AND RIGHT ARM    Back surgery       COCCYX AS  A CHILD     Family History   Problem Relation Age of Onset    Skin cancer Father     Stroke Sister 93    Anesthesia problems Neg Hx     Cancer Neg Hx     Clotting disorder Neg Hx      problems Neg Hx     Heart disease Neg Hx     Hypertension Neg Hx     Kidney cancer Neg Hx     Kidney disease Neg Hx     Malignant hyperthermia Neg Hx     Prostate cancer Neg Hx     Sickle cell trait Neg Hx     Lupus Neg Hx     Sudden death Neg Hx     Urolithiasis Neg Hx     Melanoma Neg Hx      Social History   Substance Use Topics    Smoking status: Never Smoker    Smokeless tobacco: Never Used    Alcohol use No        Review of Systems:  GENERAL: WNL  HENT: WNL  NEURO: WNL  CARDIAC: as per hpi  PULMONARY: as per  hpi  GI: WNL  : WNL  MSK: WNL  INTEGUMENTARY: WNL  HEMATOLOGIC: WNL    OBJECTIVE:     Vital Signs (Most Recent)  Temp: 97.1 °F (36.2 °C) (01/11/17 1448)  Pulse: 71 (01/11/17 1701)  Resp: (!) 35 (01/11/17 1546)  BP: (!) 160/92 (01/11/17 1701)  SpO2: 96 % (01/11/17 1701)    Vital Signs Range (Last 24H):  Temp:  [97.1 °F (36.2 °C)]   Pulse:  [71-75]   Resp:  [20-35]   BP: (156-162)/(80-92)   SpO2:  [96 %-100 %]     Physical Exam:  General: resting comfortably on nc O2, cannot complete full sentences but saturating at 100%  HEENT: perrl, eomi  Neck: jvd to ear  Heart: nl s1/2, no m/h/t, irreg irreg rhythm  Lungs: bibasilar crackles and coarse reath sound sthroughout  Abdomen: s/nt/nd, no organomegaly, abdominal pitting edema  Ext: b/l le pitting edema 3+, good cap refill, moves all  Pulses: 2+ pulses b/l ue/le  Skin: no tears, wounds, bleeding, color changes  Neuro: sensations/motor intact b/l    Laboratory:    Recent Labs  Lab 01/11/17  1550   WBC 6.61   RBC 3.75*   HGB 11.7*   HCT 36.7*   PLT 79*   MCV 98   MCH 31.2*   MCHC 31.9*     Sodium 136 - 145 mmol/L 141   Potassium 3.5 - 5.1 mmol/L 3.9   Chloride 95 - 110 mmol/L 104   CO2 23 - 29 mmol/L 27   Glucose 70 - 110 mg/dL 151 (H)   BUN, Bld 10 - 30 mg/dL 26   Creatinine 0.5 - 1.4 mg/dL 0.9   Calcium 8.7 - 10.5 mg/dL 8.8   Total Protein 6.0 - 8.4 g/dL 6.3   Albumin 3.5 - 5.2 g/dL 3.4 (L)   Total Bilirubin 0.1 - 1.0 mg/dL 1.0   Comments: For infants and newborns, interpretation of results should be based   on gestational age, weight and in agreement with clinical   observations.   Premature Infant recommended reference ranges:   Up to 24 hours.............<8.0 mg/dL   Up to 48 hours............<12.0 mg/dL   3-5 days..................<15.0 mg/dL   6-29 days.................<15.0 mg/dL      Alkaline Phosphatase 55 - 135 U/L 83   AST 10 - 40 U/L 31   ALT 10 - 44 U/L 50 (H)   Anion Gap 8 - 16 mmol/L 10   eGFR if African American >60 mL/min/1.73 m^2 >60.0   eGFR if non  African American >60 mL/min/1.73 m^2        Recent Labs  Lab 01/11/17  1550   TROPONINI 0.097*       Diagnostic Results:  Cxr: Pulmonary edema versus pneumonia.  Ecg: afib with rvr   6/16 echo  CONCLUSIONS     1 - Mildly enlarged aortic root.     2 - Mild left atrial enlargement.     3 - Concentric remodeling.     4 - Left ventricular diastolic dysfunction.     5 - Low normal right ventricular systolic function .     6 - Mild aortic regurgitation.     7 - Mild mitral regurgitation.     8 - Moderate tricuspid regurgitation.     9 - Mild pulmonic regurgitation.     10 - Pulmonary hypertension. The estimated PA systolic pressure is 66 mmHg.     ASSESSMENT/PLAN:   89 male with PMH sig for htn/HFpEF/hypothyroid presents with acute on chronic HFpEF.      Patient Active Problem List    Diagnosis Date Noted    Acute on chronic diastolic CHF (congestive heart failure) 12/09/2016    DNR (do not resuscitate)     PNA (pneumonia) 12/08/2016    Hypophosphatemia 11/16/2016    GI bleed 11/12/2016    Acute encephalopathy 11/12/2016    Acute hyponatremia 11/12/2016    Normocytic anemia 11/12/2016    Uremia 11/11/2016    Persistent atrial fibrillation 10/11/2016    Bilateral leg edema 06/23/2016    Pulmonary hypertension 06/23/2016    Heart failure with preserved ejection fraction, borderline, NYHA class I 06/23/2016    Hypothyroidism 02/05/2015    Essential hypertension 02/05/2015    Prostate cancer 08/27/2012    History of radical prostatectomy (2002) 08/27/2012       Plan:   1) Acute on Chronic HFpEF  Repeat echo   Lasix 40mg iv bid 1st dose now  Change atenolol to coreg 6.25 bid 1st dose now  Nitrostat x 1 sl to assist with diuresis   I/o/dw/galvez placed in er  2 gm na   Known problem, worsened    2) HTN, uncontrolled  Continue coreg, stop atenolol (alpha/beta)  Focus on diuresis for now  Monitor vitals  Known problem, worsened    3) Hypothyroidism  Continue synthroid, check tsh  Known problem, stable    4)  Coarse breath sounds/hoarseness of voice  suspicion of aspiration  Get speech and swallow eval for possible MBS to assess patients swallowing ability    Dvt/gi prophy    Thomas Zhou MD  [id number]

## 2017-01-12 NOTE — PT/OT/SLP PROGRESS
Speech Language Pathology      Hardik Marquez  MRN: [id number]    Patient not seen today secondary to Nursing hold (Comment) (due to pt having CORE call today). Will follow-up 1/12..    JORGE L Whitaker, CCC-SLP     JORGE L Whitaker, CCC-SLP  Speech Language Pathologist  [phone]  1/12/2017

## 2017-01-12 NOTE — PROGRESS NOTES
Progress Note  Hospital Medicine    Admit Date: 1/11/2017   LOS: 1 day     SUBJECTIVE:   Patient speaks Latvian only, family initially unavailable at bedside.  Planned for  but family showed up later in am and stating he's DNR, does not want aggressive care with ICU or pressors but fine with abx.        OBJECTIVE:     Vital Signs (Most Recent)  Temp: 98 °F (36.7 °C) (01/12/17 0431)  Pulse: (!) 54 (01/12/17 0500)  Resp: 18 (01/12/17 0431)  BP: 116/60 (01/12/17 0431)  SpO2: 95 % (01/12/17 0431)    Vital Signs Range (Last 24H):  Temp:  [97.1 °F (36.2 °C)-98 °F (36.7 °C)]   Pulse:  [46-75]   Resp:  [18-35]   BP: (116-165)/(60-96)   SpO2:  [95 %-100 %]     I & O (Last 24H):  Intake/Output Summary (Last 24 hours) at 01/12/17 0753  Last data filed at 01/12/17 0400   Gross per 24 hour   Intake                0 ml   Output             2800 ml   Net            -2800 ml       Physical Exam:  Gen: Appears ill and respiratory extremis, he's communicative but unfortunately I understand minimal Latvian, and will need a  to further exam neuro status.   Pulm: audible gurgling, crackles, throughout, rhonchi superimposed, increased wob.   CV: Distant heart tones, S1S2, irregularly irregular, no m/r/c/g, + JVD to earlobe noted  Abd: soft, nontender, nondistended, + bs  Skin/ext: w/d, +2-3 pitting peripheral edema noted, +1 pulses, no c/c    Lab:    Cardiac Biomarker:     Recent Labs  Lab 01/11/17  1550   TROPONINI 0.097*   *       CBC with Diff:     Recent Labs  Lab 01/11/17  1550 01/12/17  0447   WBC 6.61 6.30   HGB 11.7* 11.3*   HCT 36.7* 36.3*   PLT 79* 76*   LYMPH 8.0*  0.5* 8.1*  0.5*   MONO 7.4  0.5 6.5  0.4   EOSINOPHIL 0.0 0.0       COAG:    Recent Labs  Lab 01/11/17  1550   INR 1.0       CMP:   Recent Labs  Lab 01/11/17  1550 01/12/17  0447   * 133*   CALCIUM 8.8 8.5*   ALBUMIN 3.4* 3.1*   PROT 6.3 5.8*    145   K 3.9 3.4*   CO2 27 33*    100   BUN 26 24   CREATININE 0.9 0.8    ALKPHOS 83 75   ALT 50* 44   AST 31 27   BILITOT 1.0 1.0   MG  --  1.9     Estimated Creatinine Clearance: 53.9 mL/min (based on Cr of 0.8).    Diagnostic Results: xray: Portable AP view of the chest was obtained.  Comparison -- 1/11. Two metal plates project over the left lung field obscuring some detail. No endotracheal tubes or central venous lines are seen. The heart size is normal. Mediastinum shows aortic atherosclerosis. The lung volumes remain low. Both lungs show groundglass and consolidation opacity toward the bases. Bilateral pattern suggests pulmonary edema or ARDS. Pneumonia is also possible. Bilateral pleural effusions are about the same. No pneumothorax.    Telemetry: afib 48 (56-58)     Scheduled Meds:   aspirin  81 mg Oral Daily    carvedilol  6.25 mg Oral BID    donepezil  10 mg Oral QHS    enoxaparin  40 mg Subcutaneous Daily    furosemide  40 mg Intravenous BID    ipratropium  0.5 mg Nebulization Q8H    levalbuterol  0.63 mg Nebulization Q8H    levothyroxine  125 mcg Oral Daily    miconazole nitrate 2%   Topical (Top) BID    pantoprazole  40 mg Oral BID AC    sodium chloride 0.9%  3 mL Intravenous Q8H     Continuous Infusions:   PRN Meds:hydrocodone-acetaminophen 7.5-325mg    Review of patient's allergies indicates:   Allergen Reactions    Pcn [penicillins] Hives and Other (See Comments)     unknow     Active Problem:   Patient Active Problem List   Diagnosis    Prostate cancer    History of radical prostatectomy (2002)    Hypothyroidism    Essential hypertension    Bilateral leg edema    Pulmonary hypertension    Heart failure with preserved ejection fraction, borderline, NYHA class I    Persistent atrial fibrillation    Uremia    GI bleed    Acute encephalopathy    Acute hyponatremia    Normocytic anemia    Hypophosphatemia    PNA (pneumonia)    Acute on chronic diastolic CHF (congestive heart failure)    DNR (do not resuscitate)    Acute pulmonary edema          ASSESSMENT/PLAN:   89 y/o M with PMH sig for htn/HFpEF/hypothyroid presents with acute on chronic HFpEF with possible superimposed PNA, now with afib with controlled rate.     Acute on Chronic HFpEF: echo ordered cannot do until tomorrow, aggressively diurese and place on bipap, atenolol to coreg 6.25 bid and has since been d/c'ed d/t concern for evolving sepsis, though procalcitonin is < 0.09.  Now consistently in afib with may have also contributed.    - known problem worsened  HTN: controlled and appears to be declining into possible sepsis, so will hold off beta blockade for now as he's rather bradycardic and could use a bit more heart rate if sepsis is ensuing and acute exacerbation of HF, will focus on diuresis for now   - Known problem, worsened     Hypothyroidism: synthroid, check tsh in am   Known problem      Coarse breath sounds/hoarseness of voice/ unable to swallow per RN's: suspicion of aspiration  Get speech and swallow eval for possible MBS to assess patients swallowing ability    - speech and swallow consult pending, may need NGT to allow nutrition and medication management    Pulm HTN: diurese, not candidate for vasodilator therapy    Dvt/gi prophy: protonix/ lovenox     Dispo: DNR order signed will trial diuresis and abx if no improvement family will consider hospice.  Will eval in am.     Alena Ortiz NP  [facility]  Spectra link x [phone]  Pager: [phone]

## 2017-01-12 NOTE — PLAN OF CARE
Went to room; pt sleeping w bipap machine in use. No family at  so called Irene , the daughter, on her cell phone [phone]  Plan is to return back to [facility] as pt is a resident there.     01/12/17 1446   Discharge Assessment   Assessment Type Discharge Planning Assessment   Confirmed/corrected address and phone number on facesheet? Yes   Assessment information obtained from? Caregiver   Expected Length of Stay (days) 3   Communicated expected length of stay with patient/caregiver yes   Prior to hospitilization cognitive status: (pt sleeping)   Lives With facility resident   Able to Return to Prior Arrangements yes   Is patient able to care for self after discharge? No   Who are your caregiver(s) and their phone number(s)? daughter Irene can be reached on her cell phone: [phone]   Patient's perception of discharge disposition admitted as an inpatient   Patient currently being followed by outpatient case management? No   Patient currently receives home health services? No   Does the patient currently use HME? Yes   Do you have any problems affording any of your prescribed medications? No   Is the patient taking medications as prescribed? yes   Do you have any financial concerns preventing you from receiving the healthcare you need? No   Does the patient have transportation to healthcare appointments? Yes   Transportation Available none;other (see comments)   On Dialysis? No   Does the patient receive services at the [facility]? No   Are there any open cases? No   Discharge Plan A Return to nursing home   Patient/Family In Agreement With Plan yes

## 2017-01-12 NOTE — PT/OT/SLP PROGRESS
Physical Therapy      Hardik Perez  MRN: [id number]    PT orders acknowledged. PT attempted evaluation but patient not appropriate today secondary to CORE called on patient this morning. Will follow-up tomorrow.    Courtney Henry, PT   1/12/2017

## 2017-01-13 LAB
ALBUMIN SERPL BCP-MCNC: 2.9 G/DL
ALP SERPL-CCNC: 67 U/L
ALT SERPL W/O P-5'-P-CCNC: 34 U/L
ANION GAP SERPL CALC-SCNC: 10 MMOL/L
ANION GAP SERPL CALC-SCNC: 10 MMOL/L
AORTIC VALVE REGURGITATION: ABNORMAL
AST SERPL-CCNC: 23 U/L
BASOPHILS # BLD AUTO: 0.01 K/UL
BASOPHILS NFR BLD: 0.2 %
BILIRUB SERPL-MCNC: 1 MG/DL
BUN SERPL-MCNC: 19 MG/DL
BUN SERPL-MCNC: 19 MG/DL
CALCIUM SERPL-MCNC: 8.2 MG/DL
CALCIUM SERPL-MCNC: 8.6 MG/DL
CHLORIDE SERPL-SCNC: 98 MMOL/L
CHLORIDE SERPL-SCNC: 99 MMOL/L
CO2 SERPL-SCNC: 36 MMOL/L
CO2 SERPL-SCNC: 37 MMOL/L
CREAT SERPL-MCNC: 0.8 MG/DL
CREAT SERPL-MCNC: 0.9 MG/DL
DIFFERENTIAL METHOD: ABNORMAL
EOSINOPHIL # BLD AUTO: 0 K/UL
EOSINOPHIL NFR BLD: 0.2 %
ERYTHROCYTE [DISTWIDTH] IN BLOOD BY AUTOMATED COUNT: 16.4 %
EST. GFR  (AFRICAN AMERICAN): >60 ML/MIN/1.73 M^2
EST. GFR  (AFRICAN AMERICAN): >60 ML/MIN/1.73 M^2
EST. GFR  (NON AFRICAN AMERICAN): >60 ML/MIN/1.73 M^2
EST. GFR  (NON AFRICAN AMERICAN): >60 ML/MIN/1.73 M^2
ESTIMATED PA SYSTOLIC PRESSURE: 55.89
GLUCOSE SERPL-MCNC: 142 MG/DL
GLUCOSE SERPL-MCNC: 95 MG/DL
HCT VFR BLD AUTO: 35.4 %
HGB BLD-MCNC: 11.2 G/DL
LYMPHOCYTES # BLD AUTO: 0.7 K/UL
LYMPHOCYTES NFR BLD: 13 %
MAGNESIUM SERPL-MCNC: 1.6 MG/DL
MCH RBC QN AUTO: 29.9 PG
MCHC RBC AUTO-ENTMCNC: 31.6 %
MCV RBC AUTO: 95 FL
MITRAL VALVE MOBILITY: NORMAL
MITRAL VALVE REGURGITATION: ABNORMAL
MONOCYTES # BLD AUTO: 0.3 K/UL
MONOCYTES NFR BLD: 5.9 %
NEUTROPHILS # BLD AUTO: 4.5 K/UL
NEUTROPHILS NFR BLD: 80.3 %
PLATELET # BLD AUTO: 86 K/UL
PMV BLD AUTO: 10.4 FL
POTASSIUM SERPL-SCNC: 3 MMOL/L
POTASSIUM SERPL-SCNC: 4.6 MMOL/L
PROT SERPL-MCNC: 5.4 G/DL
RBC # BLD AUTO: 3.74 M/UL
RETIRED EF AND QEF - SEE NOTES: 48 (ref 55–65)
SODIUM SERPL-SCNC: 145 MMOL/L
SODIUM SERPL-SCNC: 145 MMOL/L
T4 FREE SERPL-MCNC: 0.86 NG/DL
TRICUSPID VALVE REGURGITATION: ABNORMAL
TSH SERPL DL<=0.005 MIU/L-ACNC: 6.01 UIU/ML
WBC # BLD AUTO: 5.63 K/UL

## 2017-01-13 PROCEDURE — 36415 COLL VENOUS BLD VENIPUNCTURE: CPT

## 2017-01-13 PROCEDURE — 63600175 PHARM REV CODE 636 W HCPCS: Performed by: NURSE PRACTITIONER

## 2017-01-13 PROCEDURE — 93306 TTE W/DOPPLER COMPLETE: CPT | Mod: 26,,, | Performed by: INTERNAL MEDICINE

## 2017-01-13 PROCEDURE — 63600175 PHARM REV CODE 636 W HCPCS: Performed by: INTERNAL MEDICINE

## 2017-01-13 PROCEDURE — 25000003 PHARM REV CODE 250: Performed by: EMERGENCY MEDICINE

## 2017-01-13 PROCEDURE — 84439 ASSAY OF FREE THYROXINE: CPT

## 2017-01-13 PROCEDURE — 97165 OT EVAL LOW COMPLEX 30 MIN: CPT

## 2017-01-13 PROCEDURE — 94640 AIRWAY INHALATION TREATMENT: CPT

## 2017-01-13 PROCEDURE — 80048 BASIC METABOLIC PNL TOTAL CA: CPT

## 2017-01-13 PROCEDURE — 97162 PT EVAL MOD COMPLEX 30 MIN: CPT

## 2017-01-13 PROCEDURE — 85025 COMPLETE CBC W/AUTO DIFF WBC: CPT

## 2017-01-13 PROCEDURE — 11000001 HC ACUTE MED/SURG PRIVATE ROOM

## 2017-01-13 PROCEDURE — 84443 ASSAY THYROID STIM HORMONE: CPT

## 2017-01-13 PROCEDURE — 93306 TTE W/DOPPLER COMPLETE: CPT

## 2017-01-13 PROCEDURE — 92610 EVALUATE SWALLOWING FUNCTION: CPT

## 2017-01-13 PROCEDURE — 80053 COMPREHEN METABOLIC PANEL: CPT

## 2017-01-13 PROCEDURE — 83735 ASSAY OF MAGNESIUM: CPT

## 2017-01-13 PROCEDURE — 25000003 PHARM REV CODE 250: Performed by: NURSE PRACTITIONER

## 2017-01-13 PROCEDURE — 99233 SBSQ HOSP IP/OBS HIGH 50: CPT | Mod: ,,, | Performed by: NURSE PRACTITIONER

## 2017-01-13 PROCEDURE — 25000003 PHARM REV CODE 250: Performed by: INTERNAL MEDICINE

## 2017-01-13 RX ORDER — POTASSIUM CHLORIDE 20 MEQ/15ML
60 SOLUTION ORAL
Status: COMPLETED | OUTPATIENT
Start: 2017-01-13 | End: 2017-01-13

## 2017-01-13 RX ORDER — POTASSIUM CHLORIDE 20 MEQ/15ML
60 SOLUTION ORAL ONCE
Status: COMPLETED | OUTPATIENT
Start: 2017-01-13 | End: 2017-01-13

## 2017-01-13 RX ORDER — PANTOPRAZOLE SODIUM 40 MG/1
40 TABLET, DELAYED RELEASE ORAL DAILY
Status: DISCONTINUED | OUTPATIENT
Start: 2017-01-14 | End: 2017-01-16 | Stop reason: HOSPADM

## 2017-01-13 RX ADMIN — METRONIDAZOLE 500 MG: 500 INJECTION, SOLUTION INTRAVENOUS at 05:01

## 2017-01-13 RX ADMIN — ASPIRIN 81 MG CHEWABLE TABLET 81 MG: 81 TABLET CHEWABLE at 08:01

## 2017-01-13 RX ADMIN — IPRATROPIUM BROMIDE 0.5 MG: 0.5 SOLUTION RESPIRATORY (INHALATION) at 04:01

## 2017-01-13 RX ADMIN — Medication 3 ML: at 08:01

## 2017-01-13 RX ADMIN — Medication 3 ML: at 03:01

## 2017-01-13 RX ADMIN — VANCOMYCIN HYDROCHLORIDE 1000 MG: 1 INJECTION, POWDER, LYOPHILIZED, FOR SOLUTION INTRAVENOUS at 02:01

## 2017-01-13 RX ADMIN — DONEPEZIL HYDROCHLORIDE 10 MG: 5 TABLET, FILM COATED ORAL at 08:01

## 2017-01-13 RX ADMIN — MICONAZOLE NITRATE: 20 OINTMENT TOPICAL at 12:01

## 2017-01-13 RX ADMIN — POTASSIUM CHLORIDE 60 MEQ: 20 SOLUTION ORAL at 11:01

## 2017-01-13 RX ADMIN — MICONAZOLE NITRATE: 20 OINTMENT TOPICAL at 08:01

## 2017-01-13 RX ADMIN — FUROSEMIDE 60 MG: 10 INJECTION, SOLUTION INTRAVENOUS at 08:01

## 2017-01-13 RX ADMIN — METRONIDAZOLE 500 MG: 500 INJECTION, SOLUTION INTRAVENOUS at 01:01

## 2017-01-13 RX ADMIN — LEVALBUTEROL HYDROCHLORIDE 0.63 MG: 0.63 SOLUTION RESPIRATORY (INHALATION) at 11:01

## 2017-01-13 RX ADMIN — Medication 3 ML: at 06:01

## 2017-01-13 RX ADMIN — CEFTRIAXONE 1 G: 1 INJECTION, SOLUTION INTRAVENOUS at 12:01

## 2017-01-13 RX ADMIN — LEVALBUTEROL HYDROCHLORIDE 0.63 MG: 0.63 SOLUTION RESPIRATORY (INHALATION) at 04:01

## 2017-01-13 RX ADMIN — IPRATROPIUM BROMIDE 0.5 MG: 0.5 SOLUTION RESPIRATORY (INHALATION) at 11:01

## 2017-01-13 RX ADMIN — FUROSEMIDE 60 MG: 10 INJECTION, SOLUTION INTRAVENOUS at 03:01

## 2017-01-13 RX ADMIN — METRONIDAZOLE 500 MG: 500 INJECTION, SOLUTION INTRAVENOUS at 08:01

## 2017-01-13 RX ADMIN — POTASSIUM CHLORIDE 60 MEQ: 20 SOLUTION ORAL at 08:01

## 2017-01-13 RX ADMIN — FUROSEMIDE 60 MG: 10 INJECTION, SOLUTION INTRAVENOUS at 05:01

## 2017-01-13 RX ADMIN — ENOXAPARIN SODIUM 40 MG: 100 INJECTION SUBCUTANEOUS at 11:01

## 2017-01-13 NOTE — PROGRESS NOTES
Progress Note  Hospital Medicine    Admit Date: 1/11/2017   LOS: 2 days     SUBJECTIVE:   Patient extremely agitated and confused through questioning with .     OBJECTIVE:     Vital Signs (Most Recent)  Temp: 97.4 °F (36.3 °C) (01/13/17 0744)  Pulse: 70 (01/13/17 0744)  Resp: 19 (01/13/17 0744)  BP: (!) 152/81 (01/13/17 0744)  SpO2: (!) 92 % (01/13/17 0744)    Vital Signs Range (Last 24H):  Temp:  [92.8 °F (33.8 °C)-97.7 °F (36.5 °C)]   Pulse:  [53-75]   Resp:  [15-23]   BP: (116-169)/(61-99)   SpO2:  [92 %-100 %]     I & O (Last 24H):  Intake/Output Summary (Last 24 hours) at 01/13/17 0758  Last data filed at 01/13/17 0600   Gross per 24 hour   Intake              600 ml   Output             3380 ml   Net            -2780 ml       Physical Exam:  Gen: Agitated, throwing objects, talking about money, going to alf, CRAFT x4, Awake, alert, very much confused and delerious  Pulm: still crackles and rhonchi throughout   CV: S1S2, RRR, no m/r/c/g, + JVD noted  Abd: soft, nontender, nondistended, + bs  Skin/ext: w/d, 2-3 pitting peripheral and weeping edema noted, + 2 pulses, no c/c    Lab:    Cardiac Biomarker:     Recent Labs  Lab 01/11/17  1550 01/12/17  1025   TROPONINI 0.097* 0.100*   *  --        CBC with Diff:     Recent Labs  Lab 01/11/17  1550 01/12/17  0447 01/13/17  0524   WBC 6.61 6.30 5.63   HGB 11.7* 11.3* 11.2*   HCT 36.7* 36.3* 35.4*   PLT 79* 76* 86*   LYMPH 8.0*  0.5* 8.1*  0.5* 13.0*  0.7*   MONO 7.4  0.5 6.5  0.4 5.9  0.3   EOSINOPHIL 0.0 0.0 0.2       COAG:    Recent Labs  Lab 01/11/17  1550   INR 1.0       CMP:   Recent Labs  Lab 01/11/17  1550 01/12/17  0447 01/13/17  0524   * 133* 95   CALCIUM 8.8 8.5* 8.2*   ALBUMIN 3.4* 3.1* 2.9*   PROT 6.3 5.8* 5.4*    145 145   K 3.9 3.4* 3.0*   CO2 27 33* 36*    100 99   BUN 26 24 19   CREATININE 0.9 0.8 0.8   ALKPHOS 83 75 67   ALT 50* 44 34   AST 31 27 23   BILITOT 1.0 1.0 1.0   MG  --  1.9 1.6     Estimated  Creatinine Clearance: 53.9 mL/min (based on Cr of 0.8).    Diagnostic Results:    Telemetry:afib 81      Scheduled Meds:   aspirin  81 mg Oral Daily    cefTRIAXone (ROCEPHIN) IVPB  1 g Intravenous Q24H    donepezil  10 mg Oral QHS    enoxaparin  40 mg Subcutaneous Daily    furosemide  60 mg Intravenous TID    ipratropium  0.5 mg Nebulization Q8H    levalbuterol  0.63 mg Nebulization Q8H    levothyroxine  125 mcg Oral Daily    metronidazole  500 mg Intravenous Q8H    miconazole nitrate 2%   Topical (Top) BID    pantoprazole  40 mg Oral BID AC    potassium chloride 10%  60 mEq Oral Daily    sodium chloride 0.9%  3 mL Intravenous Q8H    vancomycin (VANCOCIN) IVPB  1,000 mg Intravenous Q24H     Continuous Infusions:   PRN Meds:hydrocodone-acetaminophen 7.5-325mg    Review of patient's allergies indicates:   Allergen Reactions    Pcn [penicillins] Hives and Other (See Comments)     unknow     Active Problem:   Patient Active Problem List   Diagnosis    Prostate cancer    History of radical prostatectomy (2002)    Hypothyroidism    Essential hypertension    Bilateral leg edema    Pulmonary hypertension    Heart failure with preserved ejection fraction, borderline, NYHA class I    Persistent atrial fibrillation    Uremia    GI bleed    Acute encephalopathy    Acute hyponatremia    Normocytic anemia    Hypophosphatemia    PNA (pneumonia)    Acute on chronic diastolic CHF (congestive heart failure)    DNR (do not resuscitate)    Acute pulmonary edema    Goals of care, counseling/discussion         ASSESSMENT/PLAN:   89 y/o M with PMH sig for htn/HFpEF/hypothyroid presents with acute on chronic HFpEF with possible superimposed PNA, now with afib with controlled rate.      Acute on Chronic HFpEF: echo ordered cannot do until tomorrow, aggressively diurese and place on bipap, atenolol to coreg 6.25 bid and has since been d/c'ed d/t concern for evolving sepsis, though procalcitonin is < 0.09.  Now consistently in afib with may have also contributed.   Looks better off bipap, still requiring oxygen, will continue with current regimen.     HTN: was in acute exacerbation of HF, will focus on diuresis for now and if stable will resume BB tomorrow      Hypothyroidism: synthroid, tsh 3.01 free t4 .86       Suspicion of aspiration resolved now that he's more mentally alert. Passed speech and swallow with consult service, no need for MBS at present, though swallow with wax and wane with mental status and delirium per S/S.      Pulm HTN: diurese, not candidate for vasodilator therapy     Dvt/gi prophy: protonix/ lovenox      Dispo: DNR order signed.  Will speak with family regarding hospice in am. As we have no advance options for him and his advanced dementia       Alena Ortiz NP  [facility] Medicine [facility]  Spectra link x [phone]  Pager: [phone]

## 2017-01-13 NOTE — PT/OT/SLP EVAL
Occupational Therapy  Evaluation / Discharge Summary    Hardik Marquez   MRN: [id number]   Admitting Diagnosis: Acute on chronic diastolic CHF (congestive heart failure)    OT Date of Treatment: 01/13/17   OT Start Time: 1044  OT Stop Time: 1116  OT Total Time (min): 32 min    Billable Minutes:  Evaluation 32    Diagnosis: Acute on chronic diastolic CHF (congestive heart failure)       Past Medical History   Diagnosis Date    Basal cell carcinoma 04/20/2016     Scalp    DNR (do not resuscitate)     Hypertension     Hypothyroidism     Normocytic anemia 11/12/2016    Prostate cancer       Past Surgical History   Procedure Laterality Date    Prostate surgery      Prostatectomy      Hernia repair       X 2    Cataract extraction, bilateral      Skin cancer excision       EAR RIGHT, LEFT JAW, AND RIGHT ARM    Back surgery       COCCYX AS  A CHILD       Referring physician: Dr Zhou  Date referred to OT: 1/11/17    General Precautions: Standard, aspiration, fall, NPO  Orthopedic Precautions: N/A  Braces: N/A    Do you have any cultural, spiritual, Bahai conflicts, given your current situation?: None stated     Patient History:  Living Environment  Lives With: facility resident  Living Arrangements: residential facility  Transportation Available: family or friend will provide  Living Environment Comment: Pt 's granddaughter present at time of Eval and was willing to translate.   She stated that Pt is a facility resident at [facility] and was utilizing a W/C for mobility secondary to his   decreased safety when attempting to utilize RW to ambulate and Patient's inability to properly ambulate   with RW. Therapy worked with Pt ambulating with RW but Pt still demonstrating poor safety and control.   (A) required  to complete ADLs.  Equipment Currently Used at Home: wheelchair    Prior level of function:   Bed Mobility/Transfers: needs device and assist  Grooming: independent  Bathing: needs device and  assist  Upper Body Dressing: independent  Lower Body Dressing: needs assist  Toileting: needs device and assist  Home Management Skills: needs assist  Driving License: No  Mode of Transportation: Family  Occupation: Retired  Leisure and Hobbies: Pt is NH resident .     Dominant hand: right    Subjective:  Communicated with nurse prior to session.    Chief Complaint: Acute on chronic diastolic CHF (congestive heart failure)    Patient/Family stated goals: None stated.    Pain Ratin/10   Pain Rating Post-Intervention: 0/10    Objective:  Patient found with: peripheral IV, telemetry, oxygen    Cognitive Exam:  Oriented to: Person and only.   Follows Commands/attention: Easily distracted and inconsistently following commands even with   adult granddaughter attempting to translate.  Communication: clear/fluent but granddaughter indicating that Pt's speech was incoherent.  Memory:  RITCHIE  Safety awareness/insight to disability: impaired  Coping skills/emotional control: Inconsistently lashes out.    Visual/perceptual: RITCHIE    Physical Exam:  Postural examination/scapula alignment: No postural abnormalities identified  Skin integrity: Visible skin intact  Edema: None noted (B) UE    Sensation:   Unable to accurately test secondary to Pt's level of confusion.    Upper Extremity Range of Motion:  Right Upper Extremity: WFL  Left Upper Extremity: WFL    Upper Extremity Strength:  Right Upper Extremity: WFL  Left Upper Extremity: WFL   Strength: WFLS    Fine motor coordination:   Grossly Intact when attempting to perform self care tasks.    Gross motor coordination: WFL    Functional Mobility:  Bed Mobility:  Rolling/Turning to Left: Contact guard assistance  Rolling/Turning Right: Contact guard assistance  Scooting/Bridging: Minimum Assistance  Supine to Sit: Minimum Assistance  Sit to Supine: Minimum Assistance    Transfers:  Sit <> Stand Assistance: Stand By Assistance, Contact Guard Assistance (Pt demonstrating poor  "safety and needing both verbal and physical cueing.)  Sit <> Stand Assistive Device: No Assistive Device  Bed <> Chair Technique: Stand Pivot  Bed <> Chair Transfer Assistance: Contact Guard Assistance, Stand By Assistance  Bed <> Chair Assistive Device: No Assistive Device    Activities of Daily Living:  Feeding Level of Assistance: Activity did not occur    UE Dressing Level of Assistance: Minimum assistance (with (A) to don hospital gown secondary to IV in (L) UE. )    LE Dressing Level of Assistance: Stand by assistance (with Pt donning/doffing socks. Brief not tested at this time secondary to  Pt's incontinence and wearing condom catheter.)    Grooming Position: Seated  Grooming Level of Assistance:  (Set up and SBA required.)     Toileting Where Assessed:  (Pt is incontinent of bladder with condom catheter in place.)     Balance:   Static Sit: GOOD+: Takes MAXIMAL challenges from all directions.    Dynamic Sit: GOOD+: Maintains balance through MAXIMAL excursions of active trunk motion  Static Stand: FAIR: Maintains without assist but unable to take challenges  Dynamic stand: FAIR: Needs CONTACT GUARD during gait    Therapeutic Activities and Exercises:  OT evaluation performed.  White board updated.  Pt educated on role of OT, safety with functional mobility and ADLs    AM-PAC 6 CLICK ADL  How much help from another person does this patient currently need?  1 = Unable, Total/Dependent Assistance  2 = A lot, Maximum/Moderate Assistance  3 = A little, Minimum/Contact Guard/Supervision  4 = None, Modified [place]/Independent    Putting on and taking off regular lower body clothing? : 3  Bathing (including washing, rinsing, drying)?: 3  Toileting, which includes using toilet, bedpan, or urinal? : 3  Putting on and taking off regular upper body clothing?: 3  Taking care of personal grooming such as brushing teeth?: 3  Eating meals?: 3  Total Score: 18    AM-PAC Raw Score CMS "G-Code Modifier Level of " Impairment Assistance   6 % Total / Unable   7 - 9 CM 80 - 100% Maximal Assist   10 - 14 CL 60 - 80% Moderate Assist   15 - 19 CK 40 - 60% Moderate Assist   20 - 22 CJ 20 - 40% Minimal Assist   23 CI 1-20% SBA / CGA   24 CH 0% Independent/ Mod I       Patient left up in chair with all lines intact, call button in reach, nurse notified and granddaughter present    Assessment:  Hardik Marquez is a 89 y.o. male with a medical diagnosis of Acute on chronic diastolic CHF (congestive heart failure) .  Pt demonstrating extreme confusion and demonstrating significant difficulty following commands when being evaluated.  Pt's granddaughter attempting to translate but indicating that Pt speaks in sentences which are incoherent and he  inconsistently follows commands. Pt is inappropriate for OT services secondary to his inability to follow commands  to participate in training at this time.    Rehab identified problem list/impairments: Rehab identified problem list/impairments: impaired cognition, impaired self care skills, impaired functional mobilty, gait instability, impaired balance, decreased safety awareness, impaired skin, decreased lower extremity function    Rehab potential is poor.    Activity tolerance: Poor    Discharge recommendations: Discharge Facility/Level Of Care Needs: nursing facility, basic     Barriers to discharge: Barriers to Discharge: None    Equipment recommendations: none     GOALS:   Occupational Therapy Goals     Not on file      Multidisciplinary Problems (Resolved)        Problem: Occupational Therapy Goal    Goal Priority Disciplines Outcome Interventions   Occupational Therapy Goal   (Resolved)     OT, PT/OT Outcome(s) achieved    Description:  Pt evaluated by OT and demonstrating significant confusion and inability to consistently follow commands.  Pt's granddaughter indicating that Pt was unable to consistently follow her commands even in his native language.  Pt is inappropriate for OT  until cognition improves enough to follow commands. Recommending D/C to Basic NH.              PLAN:  Patient to be D/Darren from IP OT secondary to inability to appropriately.    Plan of Care reviewed with: Patient, Grandchild(anna)       Jonnie Burks, OTR/L  01/13/2017

## 2017-01-13 NOTE — NURSING
Pt kept taking off BiPap. Paged Dr. Renee on call who said it was OK to place patient on nasal cannula. Pt's O2 sats were 95% on room air. Pt fighting venipuncture and more alert this morning. VSS throughout shift. Will continue to monitor.

## 2017-01-13 NOTE — PT/OT/SLP EVAL
"Speech Language Pathology  Evaluation/Discharge Summary    Hardik Marquez   MRN: [id number]   Admitting Diagnosis: Acute on chronic diastolic CHF (congestive heart failure)    Diet recommendations: Solid Diet Level: Dental Soft  Liquid Diet Level: Thin Feed only when awake/alert, No straws, HOB to 90 degrees, Small bites/sips, Alternating bites/sips, 1 bite/sip at a time, Check for pocketing/oral residue, Meds crushed in puree, Meds whole 1 at a time, Eliminate distractions, Avoid talking while eating and Assistance with meals     Pt will require 1:1 feeding assistance/Supervision for all meals    SLP Treatment Date: 17  Speech Start Time: 934     Speech Stop Time: 949     Speech Total (min): 15 min       TREATMENT BILLABLE MINUTES:  Eval Swallow and Oral Function 15    Diagnosis: Acute on chronic diastolic CHF (congestive heart failure)      Past Medical History   Diagnosis Date    Basal cell carcinoma 2016     Scalp    DNR (do not resuscitate)     Hypertension     Hypothyroidism     Normocytic anemia 2016    Prostate cancer      Past Surgical History   Procedure Laterality Date    Prostate surgery      Prostatectomy      Hernia repair       X 2    Cataract extraction, bilateral      Skin cancer excision       EAR RIGHT, LEFT JAW, AND RIGHT ARM    Back surgery       COCCYX AS  A CHILD       Has the patient been evaluated by SLP for swallowing? : Yes  Keep patient NPO?: No   General Precautions: Standard, aspiration, fall    Current Respiratory Status: nasal cannula     Social Hx: Patient lives in a Nursing Home.    Prior diet: unknown.    Occupational/hobbies/homemaking: none stated.    Subjective:  "Da me de comer!  Tengo hambre!"  ("Give me something to eat!  I'm hungry!") pt stated  Patient goals: to eat and drink.    Pain Ratin/10              Pain Rating Post-Intervention: 0/10    Evaluation completed in pt's primary language, Sinhala, by Bilingual SLP.    Objective:   Patient " found with: peripheral IV, telemetry, oxygen    Oral Musculature Evaluation  Oral Musculature: unable to assess due to poor participation/comprehension  Dentition: scattered dentition  Mucosal Quality: adequate  Volitional Cough: unable to elicit  Volitional Swallow: unable to elicit  Voice Prior to PO Intake: clear,      Bedside Swallow Eval:  Consistencies Assessed: Thin liquids tsp sips and 4 oz of thin liquid by cup -self fed sips, Puree tsp bites and Soft solids self-fed bites of half of a cracker  Oral Phase: decreased attention and safety awareness, increased impulsivity w/ self-feeding, cues not beneficial and slow oral transit time  Pharyngeal Phase: WFL, no overt clinical  signs/symptoms of aspiration and no overt clinical signs/symptoms of pharyngeal dysphagia    Additional Treatment:  Education was provided to pt and granddaughter present at bedside re: SLP role, eval results, diet recs and aspiration precautions.  Pt indicated no understanding.  Pt's granddaughter indicated good understanding and agreed w/ recs.    FIM:                                 Assessment:  Hardik Marquez is a 89 y.o. male with a medical diagnosis of Acute on chronic diastolic CHF (congestive heart failure) and presents with a functional oropharyngeal swallow at this time.    Do you have any cultural, spiritual, Islam conflicts, given your current situation?: none     Discharge recommendations: Discharge Facility/Level Of Care Needs: other (see comments) (pending pt/ot recs)     Goals:   SLP Goals     Not on file      Multidisciplinary Problems (Resolved)        Problem: SLP Goal    Goal Priority Disciplines Outcome   SLP Goal   (Resolved)     SLP Outcome(s) achieved              Plan:   Patient to be seen     Plan of Care expires:    Plan of Care reviewed with: patient, family  SLP Follow-up?: No  SLP - Next Visit Date: 01/13/17           Wanda Morataya CCC-SLP  01/13/2017

## 2017-01-13 NOTE — PLAN OF CARE
Problem: SLP Goal  Goal: SLP Goal  Outcome: Outcome(s) achieved Date Met:  01/13/17  Clinical Swallow Evaluation completed.  REC:  Pt resume po intake w/ Dental Soft diet w/ thin liquids, oral meds crushed in puree/pudding or whole 1 at a time as needed, aspiration precautions, avoid straws.  Further Skilled Speech services are not indicated at this time.  Results communicated w/ team.  Cont ST per POC.     Wanda Morataya CCC-SLP  1/13/2017

## 2017-01-13 NOTE — PROGRESS NOTES
Patient agitated. Patient yelling and angery. Patient refused oxygen. Called  to come to the room.

## 2017-01-13 NOTE — PROGRESS NOTES
89 yo male admitted with respiratory distress and bilateral leg edema. Hx of heart failure w/ EF. HTN,hyperthyroidism, prostate Ca and basal cell CA  Multiple open blisters to Bilateral lower extremities which have stuck to dressings he was admitted with.   Closed blisters to dorsal surface of foot and toes.   Soaked old dressing to remove from open blisters: very painful to patient.     01/13/17 1300   ECG   Pulse 73   Rhythm atrial rhythm   Atrial Rhythm atrial fibrillation       Wound 01/13/17 1304 Blister(s) lower leg   Date First Assessed/Time First Assessed: 01/13/17 1304   Pre-existing: Yes  Wound Type: (c) Blister(s)  Side: Right  Orientation: lower  Location: leg   Wound WDL ex   Dressing Appearance moist drainage;intact   Drainage Amount small   Drainage Characteristics/Odor serous   Wound Base blistered;moist;pink;yellow;dry  (sacttered closed blisters on foot, multiple open blisters wa)   Periwound Area redness   Wound Edges open   Non-staged Wound Description Partial thickness   Cleansed W/ sterile normal saline   Irrigated W/ sterile normal saline   Dressing foam  (Mepilex foam and wrap)       Wound 01/13/17 1308 Blister(s) lower leg   Date First Assessed/Time First Assessed: 01/13/17 1308   Pre-existing: Yes  Wound Type: Blister(s)  Side: Left  Orientation: lower  Location: leg   Wound WDL ex   Dressing Appearance dry;intact   Drainage Amount small   Drainage Characteristics/Odor serous   Wound Base clean;moist;pink   Periwound Area normal skin tone   Wound Edges open   Non-staged Wound Description Partial thickness   Cleansed W/ sterile normal saline   Dressing foam  (mepilex foam and wrap)       Wound 01/13/17 1309 Skin tear elbow   Date First Assessed/Time First Assessed: 01/13/17 1309   Pre-existing: Yes  Wound Type: Skin tear  Location: elbow   Wound WDL ex   Dressing Appearance no dressing   Drainage Amount small   Drainage Characteristics/Odor sanguineous   Wound Base bleeding;purple  (flap of  loose skin)   Periwound Area normal skin tone   Wound Edges jagged   Wound Length (cm) 2   Wound Width (cm) 0.3   Non-staged Wound Description Partial thickness   Cleansed W/ sterile normal saline   Irrigated W/ sterile normal saline   Dressing foam;Dressing applied     Mepilex foam dressing applied to all blisters and wrapped to secure.   Family at bedside the entire time. Requests using a nonstick dressing like mepilex foam when he is discharged.   Katerin Garcia RN,CWON  [phone]

## 2017-01-13 NOTE — PT/OT/SLP EVAL
Physical Therapy  Evaluation/Discharge     Hardik Marquez   MRN: [id number]   Admitting Diagnosis: Acute on chronic diastolic CHF (congestive heart failure)    PT Received On: 01/13/17  PT Start Time: 1043     PT Stop Time: 1111    PT Total Time (min): 28 min       Billable Minutes:  Evaluation 28 minutes Co eval with OT    Diagnosis: Acute on chronic diastolic CHF (congestive heart failure)    Past Medical History   Diagnosis Date    Basal cell carcinoma 04/20/2016     Scalp    DNR (do not resuscitate)     Hypertension     Hypothyroidism     Normocytic anemia 11/12/2016    Prostate cancer       Past Surgical History   Procedure Laterality Date    Prostate surgery      Prostatectomy      Hernia repair       X 2    Cataract extraction, bilateral      Skin cancer excision       EAR RIGHT, LEFT JAW, AND RIGHT ARM    Back surgery       COCCYX AS  A CHILD       Referring physician: MARYAM Yanes  Date referred to PT: 1/12/2017    General Precautions: Standard, aspiration, fall, NPO  Orthopedic Precautions: N/A   Braces: N/A       Do you have any cultural, spiritual, Sabianism conflicts, given your current situation?: None stated     Patient History:  Lives With: facility resident  Living Arrangements: residential facility  Living Environment Comment: Pt's granddaughter present for therapy evaluation and agreeable to translate. Pt's granddaughter states patient is a facility resident at [facility]. Pt required WC for functional mobility secondary to decreased safety awareness. She states patient was ambulating with therapy with RW but poor safety. Pt required assist for all ADLs.   Equipment Currently Used at Home: wheelchair    Previous Level of Function:  Ambulation Skills: needs device (Pt mostly used WC due to decreased safety)  Transfer Skills: needs assist (due to safety)  ADL Skills: needs assist  Work/Leisure Activity: needs assist    Subjective:  Communicated with RN prior to session.  Pt mixing Mauritanian and  English together.   Chief Complaint: request to take dressing off BLE  Patient goals: no goals stated     Pain Ratin/10    Pain Rating Post-Intervention: 0/10    Objective:   Patient found with: peripheral IV, telemetry, oxygen     Cognitive Exam:  Oriented to: Person    Follows Commands/attention: Inattentive, Easily distracted and inconsistent following commends even with adult grand daughter attempted to translate  Communication: clear/fluent but granddaughter indicating that patient's speech was incoherent   Safety awareness/insight to disability: impaired    Physical Exam:  Postural examination/scapula alignment: No postural abnormalities identified    Skin integrity: Visible skin intact  Edema: None noted in BLE    Sensation: Unable to formerly assess secondary to patient's cognition/confusion    Lower Extremity Range of Motion:  Right Lower Extremity: WFL  Left Lower Extremity: WFL    Lower Extremity Strength: unable to formerly assess but based on functional mobility   Right Lower Extremity: WFL  Left Lower Extremity: WFL     Functional Mobility: Pt demonstrating poor safety and needing both verbal and physical cueing.)  Bed Mobility:  Rolling/Turning to Left: Contact guard assistance, With side rail  Rolling/Turning Right: Contact guard assistance, With side rail  Scooting/Bridging: Contact Guard Assistance (anterior scoot)  Supine to Sit: Minimum Assistance    Transfers:  Sit <> Stand Assistance: Contact Guard Assistance (due to safety)  Sit <> Stand Assistive Device: No Assistive Device  Bed <> Chair Technique: Stand Pivot  Bed <> Chair Assistance: Contact Guard Assistance  Bed <> Chair Assistive Device: No Assistive Device    Gait:   Gait Distance: 3 side steps with CGA  Assistance 1: Contact Guard Assistance  Gait Assistive Device: No device  Gait Pattern: 3-point gait  Gait Deviation(s): decreased peggy, decreased step length, decreased stride length    Balance:   Static Sit: GOOD: Takes MODERATE  challenges from all directions  Dynamic Sit: GOOD-: Maintains balance through MODERATE excursions of active trunk movement,     Static Stand: FAIR: Maintains without assist but unable to take challenges  Dynamic stand: FAIR: Needs CONTACT GUARD during gait    Therapeutic Activities and Exercises:  Pt educated on role of PT and POC as well as safety with mobility. Pt's granddaughter verbalized understanding and she expressed no further concerns/questions.   White board updated.       AM-PAC 6 CLICK MOBILITY  How much help from another person does this patient currently need?   1 = Unable, Total/Dependent Assistance  2 = A lot, Maximum/Moderate Assistance  3 = A little, Minimum/Contact Guard/Supervision  4 = None, Modified [place]/Independent    Turning over in bed (including adjusting bedclothes, sheets and blankets)?: 3  Sitting down on and standing up from a chair with arms (e.g., wheelchair, bedside commode, etc.): 3  Moving from lying on back to sitting on the side of the bed?: 3  Moving to and from a bed to a chair (including a wheelchair)?: 3  Need to walk in hospital room?: 3  Climbing 3-5 steps with a railing?: 2  Total Score: 17     AM-PAC Raw Score CMS G-Code Modifier Level of Impairment Assistance   6 % Total / Unable   7 - 9 CM 80 - 100% Maximal Assist   10 - 14 CL 60 - 80% Moderate Assist   15 - 19 CK 40 - 60% Moderate Assist   20 - 22 CJ 20 - 40% Minimal Assist   23 CI 1-20% SBA / CGA   24 CH 0% Independent/ Mod I     Patient left up in chair with all lines intact, call button in reach, RN notified and grand daughter present.    Assessment:   Hardik Marquez is a 89 y.o. male with a medical diagnosis of Acute on chronic diastolic CHF (congestive heart failure). Upon initial PT evaluation, pt presents with decreased cognition and agitation. Pt's granddaughter present translating but patient unable to follow commands and incoherent speech. Pt completed functional mobility with CGA secondary  to poor safety awareness.Pt is inappropriate for PT services secondary to his inability to follow commands  to participate in training at this time. Anticipate DC to basic NH.       Rehab identified problem list/impairments: Rehab identified problem list/impairments: impaired cognition, decreased safety awareness, impaired self care skills, impaired functional mobilty, impaired balance, decreased lower extremity function, impaired skin    Rehab potential is poor.    Activity tolerance: Poor    Discharge recommendations: Discharge Facility/Level Of Care Needs: nursing facility, basic     Barriers to discharge: Barriers to Discharge: None    Equipment recommendations: Equipment Needed After Discharge: none     GOALS: No goals established.     PLAN:  Discharge  Plan of Care reviewed with: patient, grandchild(anna)     Courtnye Henry, PT  01/13/2017

## 2017-01-13 NOTE — PLAN OF CARE
Problem: Physical Therapy Goal  Goal: Physical Therapy Goal  Outcome: Outcome(s) achieved Date Met:  01/13/17  PT evaluation complete and no goals established. Pt currently at baseline for cognition with decreased safety awareness. Pt not appropriate for therapy secondary to unable to follow commands and baseline for functional mobility. Anticipate DC: return to NH (Basic at [facility])     Courtney Henry DPT, PT  1/13/2017

## 2017-01-13 NOTE — PHYSICIAN QUERY
PT Name: Hardik Marquez  MR #: [id number]     Physician Query Form - Medication-Correlation for Diagnosis    Reviewer  Ext [phone] Loren estrella RN CDS    This form is a permanent document in the medical record.     Query Date: January 13, 2017    Dear Provider,   By submitting this query, we are merely seeking further clarification of documentation to reflect the severity of illness of your patient. Please utilize your independent clinical judgment when addressing the question(s) below.                The patient has an order for the following medication(s): Donepezil 10 mg QD PO              Please provide the corresponding diagnosis or diagnoses that supports the use of the medication(s).    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

## 2017-01-14 PROBLEM — F03.918 DEMENTIA WITH BEHAVIORAL DISTURBANCE: Chronic | Status: ACTIVE | Noted: 2017-01-14

## 2017-01-14 LAB
ALBUMIN SERPL BCP-MCNC: 3.1 G/DL
ALP SERPL-CCNC: 75 U/L
ALT SERPL W/O P-5'-P-CCNC: 33 U/L
ANION GAP SERPL CALC-SCNC: 10 MMOL/L
AST SERPL-CCNC: 24 U/L
BILIRUB SERPL-MCNC: 0.9 MG/DL
BUN SERPL-MCNC: 17 MG/DL
CALCIUM SERPL-MCNC: 8.4 MG/DL
CHLORIDE SERPL-SCNC: 97 MMOL/L
CO2 SERPL-SCNC: 39 MMOL/L
CREAT SERPL-MCNC: 0.8 MG/DL
EST. GFR  (AFRICAN AMERICAN): >60 ML/MIN/1.73 M^2
EST. GFR  (NON AFRICAN AMERICAN): >60 ML/MIN/1.73 M^2
GLUCOSE SERPL-MCNC: 133 MG/DL
MAGNESIUM SERPL-MCNC: 1.7 MG/DL
POTASSIUM SERPL-SCNC: 3.3 MMOL/L
PROT SERPL-MCNC: 5.7 G/DL
SODIUM SERPL-SCNC: 146 MMOL/L

## 2017-01-14 PROCEDURE — 63600175 PHARM REV CODE 636 W HCPCS: Performed by: NURSE PRACTITIONER

## 2017-01-14 PROCEDURE — 25000003 PHARM REV CODE 250: Performed by: EMERGENCY MEDICINE

## 2017-01-14 PROCEDURE — 94640 AIRWAY INHALATION TREATMENT: CPT

## 2017-01-14 PROCEDURE — 94761 N-INVAS EAR/PLS OXIMETRY MLT: CPT

## 2017-01-14 PROCEDURE — 20600001 HC STEP DOWN PRIVATE ROOM

## 2017-01-14 PROCEDURE — 25000003 PHARM REV CODE 250: Performed by: INTERNAL MEDICINE

## 2017-01-14 PROCEDURE — 25000003 PHARM REV CODE 250: Performed by: NURSE PRACTITIONER

## 2017-01-14 PROCEDURE — 80053 COMPREHEN METABOLIC PANEL: CPT

## 2017-01-14 PROCEDURE — 36415 COLL VENOUS BLD VENIPUNCTURE: CPT

## 2017-01-14 PROCEDURE — 99233 SBSQ HOSP IP/OBS HIGH 50: CPT | Mod: ,,, | Performed by: NURSE PRACTITIONER

## 2017-01-14 PROCEDURE — 63600175 PHARM REV CODE 636 W HCPCS: Performed by: INTERNAL MEDICINE

## 2017-01-14 PROCEDURE — 83735 ASSAY OF MAGNESIUM: CPT

## 2017-01-14 PROCEDURE — 27000221 HC OXYGEN, UP TO 24 HOURS

## 2017-01-14 RX ORDER — POTASSIUM CHLORIDE 20 MEQ/15ML
60 SOLUTION ORAL
Status: COMPLETED | OUTPATIENT
Start: 2017-01-14 | End: 2017-01-14

## 2017-01-14 RX ORDER — CARVEDILOL 3.12 MG/1
3.12 TABLET ORAL 2 TIMES DAILY
Status: CANCELLED | OUTPATIENT
Start: 2017-01-14

## 2017-01-14 RX ORDER — CARVEDILOL 3.12 MG/1
3.12 TABLET ORAL 2 TIMES DAILY
Status: DISCONTINUED | OUTPATIENT
Start: 2017-01-14 | End: 2017-01-15

## 2017-01-14 RX ORDER — AMOXICILLIN 250 MG
1 CAPSULE ORAL DAILY PRN
Status: DISCONTINUED | OUTPATIENT
Start: 2017-01-14 | End: 2017-01-16 | Stop reason: HOSPADM

## 2017-01-14 RX ADMIN — METRONIDAZOLE 500 MG: 500 INJECTION, SOLUTION INTRAVENOUS at 05:01

## 2017-01-14 RX ADMIN — VANCOMYCIN HYDROCHLORIDE 1000 MG: 1 INJECTION, POWDER, LYOPHILIZED, FOR SOLUTION INTRAVENOUS at 02:01

## 2017-01-14 RX ADMIN — CEFTRIAXONE 1 G: 1 INJECTION, SOLUTION INTRAVENOUS at 12:01

## 2017-01-14 RX ADMIN — Medication 3 ML: at 02:01

## 2017-01-14 RX ADMIN — DONEPEZIL HYDROCHLORIDE 10 MG: 5 TABLET, FILM COATED ORAL at 09:01

## 2017-01-14 RX ADMIN — IPRATROPIUM BROMIDE 0.5 MG: 0.5 SOLUTION RESPIRATORY (INHALATION) at 03:01

## 2017-01-14 RX ADMIN — IPRATROPIUM BROMIDE 0.5 MG: 0.5 SOLUTION RESPIRATORY (INHALATION) at 08:01

## 2017-01-14 RX ADMIN — Medication 3 ML: at 05:01

## 2017-01-14 RX ADMIN — LEVALBUTEROL HYDROCHLORIDE 0.63 MG: 0.63 SOLUTION RESPIRATORY (INHALATION) at 08:01

## 2017-01-14 RX ADMIN — CARVEDILOL 3.12 MG: 3.12 TABLET, FILM COATED ORAL at 09:01

## 2017-01-14 RX ADMIN — ASPIRIN 81 MG CHEWABLE TABLET 81 MG: 81 TABLET CHEWABLE at 09:01

## 2017-01-14 RX ADMIN — ENOXAPARIN SODIUM 40 MG: 100 INJECTION SUBCUTANEOUS at 10:01

## 2017-01-14 RX ADMIN — LEVALBUTEROL HYDROCHLORIDE 0.63 MG: 0.63 SOLUTION RESPIRATORY (INHALATION) at 03:01

## 2017-01-14 RX ADMIN — FUROSEMIDE 60 MG: 10 INJECTION, SOLUTION INTRAVENOUS at 05:01

## 2017-01-14 RX ADMIN — Medication 3 ML: at 09:01

## 2017-01-14 RX ADMIN — METRONIDAZOLE 500 MG: 500 INJECTION, SOLUTION INTRAVENOUS at 12:01

## 2017-01-14 RX ADMIN — MAGNESIUM SULFATE HEPTAHYDRATE 3 G: 500 INJECTION, SOLUTION INTRAMUSCULAR; INTRAVENOUS at 09:01

## 2017-01-14 RX ADMIN — METRONIDAZOLE 500 MG: 500 INJECTION, SOLUTION INTRAVENOUS at 09:01

## 2017-01-14 RX ADMIN — LEVOTHYROXINE SODIUM 125 MCG: 25 TABLET ORAL at 05:01

## 2017-01-14 RX ADMIN — FUROSEMIDE 60 MG: 10 INJECTION, SOLUTION INTRAVENOUS at 02:01

## 2017-01-14 RX ADMIN — POTASSIUM CHLORIDE 60 MEQ: 20 SOLUTION ORAL at 10:01

## 2017-01-14 RX ADMIN — POTASSIUM CHLORIDE 60 MEQ: 20 SOLUTION ORAL at 09:01

## 2017-01-14 RX ADMIN — FUROSEMIDE 60 MG: 10 INJECTION, SOLUTION INTRAVENOUS at 09:01

## 2017-01-14 RX ADMIN — PANTOPRAZOLE SODIUM 40 MG: 40 TABLET, DELAYED RELEASE ORAL at 09:01

## 2017-01-14 NOTE — PROGRESS NOTES
Progress Note  Hospital Medicine    Admit Date: 1/11/2017   LOS: 3 days     SUBJECTIVE:   Patient speaking Persian, will need .     OBJECTIVE:     Vital Signs (Most Recent)  Temp: 97.6 °F (36.4 °C) (01/14/17 0359)  Pulse: 68 (01/14/17 0700)  Resp: 16 (01/14/17 0359)  BP: 113/76 (01/14/17 0359)  SpO2: (!) 94 % (01/14/17 0359)    Vital Signs Range (Last 24H):  Temp:  [97.3 °F (36.3 °C)-97.8 °F (36.6 °C)]   Pulse:  [51-80]   Resp:  [14-19]   BP: (113-167)/(72-82)   SpO2:  [92 %-100 %]     I & O (Last 24H):No intake or output data in the 24 hours ending 01/14/17 0726    Physical Exam:  Gen: Not agitated today, but he is talking loud ? D/t [place], he's cooperative with my exam, CRAFT x4, Awake, alert,  Pulm: still crackles and rhonchi throughout and intermittent wheezing  CV: S1S2, RRR, + mitral blowing murmer I/VI, no r/c/g, + JVD to earlobe pulsatile noted  Abd: soft, nontender, nondistended, + bs  Skin/ext: w/d, +3 pitting peripheral edema, weeping with blister's down his leg and on his toes, + 2 pulses, no c/c     Lab:    Cardiac Biomarker:     Recent Labs  Lab 01/11/17  1550 01/12/17  1025   TROPONINI 0.097* 0.100*   *  --        CBC with Diff:     Recent Labs  Lab 01/11/17  1550 01/12/17  0447 01/13/17  0524   WBC 6.61 6.30 5.63   HGB 11.7* 11.3* 11.2*   HCT 36.7* 36.3* 35.4*   PLT 79* 76* 86*   LYMPH 8.0*  0.5* 8.1*  0.5* 13.0*  0.7*   MONO 7.4  0.5 6.5  0.4 5.9  0.3   EOSINOPHIL 0.0 0.0 0.2       COAG:    Recent Labs  Lab 01/11/17  1550   INR 1.0       CMP:   Recent Labs  Lab 01/12/17  0447 01/13/17  0524 01/13/17  1722 01/14/17  0509   * 95 142* 133*   CALCIUM 8.5* 8.2* 8.6* 8.4*   ALBUMIN 3.1* 2.9*  --  3.1*   PROT 5.8* 5.4*  --  5.7*    145 145 146*   K 3.4* 3.0* 4.6 3.3*   CO2 33* 36* 37* 39*    99 98 97   BUN 24 19 19 17   CREATININE 0.8 0.8 0.9 0.8   ALKPHOS 75 67  --  75   ALT 44 34  --  33   AST 27 23  --  24   BILITOT 1.0 1.0  --  0.9   MG 1.9 1.6  --  1.7      Estimated Creatinine Clearance: 53.9 mL/min (based on Cr of 0.8).    Diagnostic Results:  1/14/17 2d cfd echo:   1 - Mildly depressed left ventricular systolic function (EF 45-50%).     2 - Concentric remodeling.     3 - Right atrial enlargement.     4 - Moderately depressed right ventricular systolic function .     5 - Trivial aortic regurgitation.     6 - Mild mitral regurgitation.     7 - Mild to moderate tricuspid regurgitation.     8 - Mild to moderate pulmonic regurgitation.     9 - Intermediate central venous pressure.     10 - Left pleural effusion.     11 - Pulmonary hypertension. The estimated PA systolic pressure is 56 mmHg.     Telemetry: afib controlled rate 60-80     Scheduled Meds:   aspirin  81 mg Oral Daily    cefTRIAXone (ROCEPHIN) IVPB  1 g Intravenous Q24H    donepezil  10 mg Oral QHS    enoxaparin  40 mg Subcutaneous Daily    furosemide  60 mg Intravenous TID    ipratropium  0.5 mg Nebulization Q8H    levalbuterol  0.63 mg Nebulization Q8H    levothyroxine  125 mcg Oral Daily    metronidazole  500 mg Intravenous Q8H    miconazole nitrate 2%   Topical (Top) BID    pantoprazole  40 mg Oral Daily    sodium chloride 0.9%  3 mL Intravenous Q8H    vancomycin (VANCOCIN) IVPB  1,000 mg Intravenous Q24H     Continuous Infusions:   PRN Meds:hydrocodone-acetaminophen 7.5-325mg    Review of patient's allergies indicates:   Allergen Reactions    Pcn [penicillins] Hives and Other (See Comments)     unknow     Active Problem:   Patient Active Problem List   Diagnosis    Prostate cancer    History of radical prostatectomy (2002)    Hypothyroidism    Essential hypertension    Bilateral leg edema    Pulmonary hypertension    Heart failure with preserved ejection fraction, borderline, NYHA class I    Persistent atrial fibrillation    Uremia    GI bleed    Acute encephalopathy    Acute hyponatremia    Normocytic anemia    Hypophosphatemia    PNA (pneumonia)    Acute on chronic  "diastolic CHF (congestive heart failure)    DNR (do not resuscitate)    Acute pulmonary edema    Goals of care, counseling/discussion         ASSESSMENT/PLAN:   89 y/o M with PMH sig for htn/HFpEF/hypothyroid presents with acute on chronic HFpEF with possible superimposed PNA, now with afib with controlled rate.       Acute on Chronic HFpEF: EF 45-50% which is slightly down from 55%, P HTN 56mmHg, RAP 8 after post aggressive diurese and bipap the other day   - restart lower dosage of coreg 3.125 bid, as his sepsis and acute decompensated heart failure is resolving   - Now consistently in afib with may have also contributed, controlled rate.    - L pleural effusion on echo, may not be able to mobilize all this fluid      - anasarca up to his mid chest posteriorly    PNA: concern for HAP, will give course of 7 days of abx since he's from a nursing home, quite debiltated, anasarca and was hypotensive with florid volume overload which is mudding turner for possible infection, though he perked up with diuretics and abx so hard to tell which was the one that improved or combination of both    HTN: resume BB low dose 3.125 bid      Dementia with behavioral disturbance: nonsensical, throwing objects at staff, on donepezil   - donepezil, may benefit from another but will defer that to his NH physician, for now he's in acute phase delerium from illness and hospitalization and this might clear in coming days.    Hypothyroidism: synthroid, tsh 3.01 free t4 .86       Suspicion of aspiration resolved now that he's more mentally alert. Passed speech and swallow with consult service, no need for MBS at present, though swallow with wax and wane with mental status and delirium per S/S. Continue abx for now as he seems to be improving.       Pulm HTN: diurese, not candidate for vasodilator therapy      Hypokalemia/ Hypomag: replete    Dvt/gi prophy: protonix/ lovenox    Angled Linear 1" wide Ecchymotic area on his back: " concern for some sort of restraint device, will look for documentation if present at the NH or upon admission.       Dispo: DNR order signed. Will speak with family regarding hospice in am. As we have no advance options for him and his advanced dementia      Alena Ortiz NP  [facility] Medicine [facility]  Spectra link x [phone]  Pager: [phone]

## 2017-01-15 PROBLEM — R68.0: Status: ACTIVE | Noted: 2017-01-15

## 2017-01-15 LAB
ALBUMIN SERPL BCP-MCNC: 3.3 G/DL
ALP SERPL-CCNC: 74 U/L
ALT SERPL W/O P-5'-P-CCNC: 32 U/L
ANION GAP SERPL CALC-SCNC: 10 MMOL/L
AST SERPL-CCNC: 27 U/L
BILIRUB SERPL-MCNC: 1 MG/DL
BUN SERPL-MCNC: 12 MG/DL
CALCIUM SERPL-MCNC: 8.5 MG/DL
CHLORIDE SERPL-SCNC: 92 MMOL/L
CO2 SERPL-SCNC: 41 MMOL/L
CREAT SERPL-MCNC: 0.7 MG/DL
EST. GFR  (AFRICAN AMERICAN): >60 ML/MIN/1.73 M^2
EST. GFR  (NON AFRICAN AMERICAN): >60 ML/MIN/1.73 M^2
GLUCOSE SERPL-MCNC: 129 MG/DL
MAGNESIUM SERPL-MCNC: 2.1 MG/DL
POTASSIUM SERPL-SCNC: 3.5 MMOL/L
PROT SERPL-MCNC: 6 G/DL
SODIUM SERPL-SCNC: 143 MMOL/L
VANCOMYCIN TROUGH SERPL-MCNC: 11.6 UG/ML

## 2017-01-15 PROCEDURE — 87449 NOS EACH ORGANISM AG IA: CPT

## 2017-01-15 PROCEDURE — 63600175 PHARM REV CODE 636 W HCPCS: Performed by: NURSE PRACTITIONER

## 2017-01-15 PROCEDURE — 87427 SHIGA-LIKE TOXIN AG IA: CPT

## 2017-01-15 PROCEDURE — 83735 ASSAY OF MAGNESIUM: CPT

## 2017-01-15 PROCEDURE — 94640 AIRWAY INHALATION TREATMENT: CPT

## 2017-01-15 PROCEDURE — 25000003 PHARM REV CODE 250: Performed by: EMERGENCY MEDICINE

## 2017-01-15 PROCEDURE — 20600001 HC STEP DOWN PRIVATE ROOM

## 2017-01-15 PROCEDURE — 36415 COLL VENOUS BLD VENIPUNCTURE: CPT

## 2017-01-15 PROCEDURE — 99233 SBSQ HOSP IP/OBS HIGH 50: CPT | Mod: ,,, | Performed by: NURSE PRACTITIONER

## 2017-01-15 PROCEDURE — 87046 STOOL CULTR AEROBIC BACT EA: CPT | Mod: 59

## 2017-01-15 PROCEDURE — 25000003 PHARM REV CODE 250: Performed by: INTERNAL MEDICINE

## 2017-01-15 PROCEDURE — 80053 COMPREHEN METABOLIC PANEL: CPT

## 2017-01-15 PROCEDURE — 87045 FECES CULTURE AEROBIC BACT: CPT

## 2017-01-15 PROCEDURE — 63600175 PHARM REV CODE 636 W HCPCS: Performed by: INTERNAL MEDICINE

## 2017-01-15 PROCEDURE — 80202 ASSAY OF VANCOMYCIN: CPT

## 2017-01-15 PROCEDURE — 25000003 PHARM REV CODE 250: Performed by: NURSE PRACTITIONER

## 2017-01-15 RX ORDER — METOLAZONE 5 MG/1
5 TABLET ORAL DAILY
Status: DISCONTINUED | OUTPATIENT
Start: 2017-01-15 | End: 2017-01-16 | Stop reason: HOSPADM

## 2017-01-15 RX ORDER — CARVEDILOL 6.25 MG/1
6.25 TABLET ORAL 2 TIMES DAILY
Status: DISCONTINUED | OUTPATIENT
Start: 2017-01-15 | End: 2017-01-16 | Stop reason: HOSPADM

## 2017-01-15 RX ORDER — POTASSIUM CHLORIDE 20 MEQ/15ML
60 SOLUTION ORAL
Status: COMPLETED | OUTPATIENT
Start: 2017-01-15 | End: 2017-01-15

## 2017-01-15 RX ADMIN — POTASSIUM CHLORIDE 60 MEQ: 20 SOLUTION ORAL at 10:01

## 2017-01-15 RX ADMIN — Medication 3 ML: at 06:01

## 2017-01-15 RX ADMIN — CARVEDILOL 6.25 MG: 6.25 TABLET, FILM COATED ORAL at 10:01

## 2017-01-15 RX ADMIN — POTASSIUM CHLORIDE 60 MEQ: 20 SOLUTION ORAL at 01:01

## 2017-01-15 RX ADMIN — Medication 3 ML: at 10:01

## 2017-01-15 RX ADMIN — ASPIRIN 81 MG CHEWABLE TABLET 81 MG: 81 TABLET CHEWABLE at 09:01

## 2017-01-15 RX ADMIN — FUROSEMIDE 60 MG: 10 INJECTION, SOLUTION INTRAVENOUS at 06:01

## 2017-01-15 RX ADMIN — LEVOTHYROXINE SODIUM 125 MCG: 25 TABLET ORAL at 06:01

## 2017-01-15 RX ADMIN — METRONIDAZOLE 500 MG: 500 INJECTION, SOLUTION INTRAVENOUS at 05:01

## 2017-01-15 RX ADMIN — ENOXAPARIN SODIUM 40 MG: 100 INJECTION SUBCUTANEOUS at 01:01

## 2017-01-15 RX ADMIN — FUROSEMIDE 60 MG: 10 INJECTION, SOLUTION INTRAVENOUS at 10:01

## 2017-01-15 RX ADMIN — IPRATROPIUM BROMIDE 0.5 MG: 0.5 SOLUTION RESPIRATORY (INHALATION) at 03:01

## 2017-01-15 RX ADMIN — METRONIDAZOLE 500 MG: 500 INJECTION, SOLUTION INTRAVENOUS at 10:01

## 2017-01-15 RX ADMIN — LEVALBUTEROL HYDROCHLORIDE 0.63 MG: 0.63 SOLUTION RESPIRATORY (INHALATION) at 01:01

## 2017-01-15 RX ADMIN — IPRATROPIUM BROMIDE 0.5 MG: 0.5 SOLUTION RESPIRATORY (INHALATION) at 08:01

## 2017-01-15 RX ADMIN — FUROSEMIDE 60 MG: 10 INJECTION, SOLUTION INTRAVENOUS at 01:01

## 2017-01-15 RX ADMIN — VANCOMYCIN HYDROCHLORIDE 1000 MG: 1 INJECTION, POWDER, LYOPHILIZED, FOR SOLUTION INTRAVENOUS at 02:01

## 2017-01-15 RX ADMIN — DONEPEZIL HYDROCHLORIDE 10 MG: 5 TABLET, FILM COATED ORAL at 10:01

## 2017-01-15 RX ADMIN — METOLAZONE 5 MG: 5 TABLET ORAL at 10:01

## 2017-01-15 RX ADMIN — IPRATROPIUM BROMIDE 0.5 MG: 0.5 SOLUTION RESPIRATORY (INHALATION) at 01:01

## 2017-01-15 RX ADMIN — CEFTRIAXONE 1 G: 1 INJECTION, SOLUTION INTRAVENOUS at 04:01

## 2017-01-15 RX ADMIN — PANTOPRAZOLE SODIUM 40 MG: 40 TABLET, DELAYED RELEASE ORAL at 09:01

## 2017-01-15 RX ADMIN — CARVEDILOL 3.12 MG: 3.12 TABLET, FILM COATED ORAL at 09:01

## 2017-01-15 RX ADMIN — MICONAZOLE NITRATE: 20 OINTMENT TOPICAL at 10:01

## 2017-01-15 RX ADMIN — Medication 3 ML: at 01:01

## 2017-01-15 RX ADMIN — METRONIDAZOLE 500 MG: 500 INJECTION, SOLUTION INTRAVENOUS at 06:01

## 2017-01-15 NOTE — PROGRESS NOTES
01/15/17 0956   Vital Signs   Temp (!) 94.9 °F (34.9 °C)   Temp src Rectal   Bear hugger ordered. ROXI Ortiz notified. No new orders. Will continue to assess pT and implement orders.

## 2017-01-15 NOTE — PROGRESS NOTES
01/14/17 1745   Vital Signs   Temp (!) 95 °F (35 °C)   Temp src Rectal   MD Farzana notified. pT cool to touch not cold. Ordered bear warmer.

## 2017-01-15 NOTE — PROGRESS NOTES
Progress Note  Hospital Medicine    Admit Date: 1/11/2017   LOS: 4 days     SUBJECTIVE:   Patient without complaints or questions.  Granddaughter at bedside and relayed her family discussed patient should be palliative care.  They would like to arrange hospice.  They do not know which one they would like to use and asking for booklet to look up.     OBJECTIVE:     Vital Signs (Most Recent)  Temp: 96 °F (35.6 °C) (01/14/17 2030)  Pulse: 78 (01/15/17 0800)  Resp: (!) 21 (01/15/17 0800)  BP: 126/80 (01/15/17 0400)  SpO2: 99 % (01/15/17 0800)    Vital Signs Range (Last 24H):  Temp:  [95 °F (35 °C)-97.9 °F (36.6 °C)]   Pulse:  [61-78]   Resp:  [15-21]   BP: (126-156)/(73-95)   SpO2:  [94 %-99 %]     I & O (Last 24H):  Intake/Output Summary (Last 24 hours) at 01/15/17 0811  Last data filed at 01/15/17 0630   Gross per 24 hour   Intake              810 ml   Output              850 ml   Net              -40 ml       Physical Exam:  Gen: NAD, CRAFT x4, AAOx self, speaking Guatemalan and nonsensical conversation per the granddaughter.   Pulm: coarse breath sounds throughout  CV: S1S2, RRR, no m/r/c/g, No JVD noted  Abd: soft, nontender, nondistended, + bs  Skin/ext: w/d, +1 peripheral edema noted, rubor subsiding, blisters noted on toes on right foot with weeping legs wrapped with serous stained gauze, +1 pulses, no c/c    Lab:    Cardiac Biomarker:     Recent Labs  Lab 01/11/17  1550 01/12/17  1025   TROPONINI 0.097* 0.100*   *  --        CBC with Diff:     Recent Labs  Lab 01/11/17  1550 01/12/17  0447 01/13/17  0524   WBC 6.61 6.30 5.63   HGB 11.7* 11.3* 11.2*   HCT 36.7* 36.3* 35.4*   PLT 79* 76* 86*   LYMPH 8.0*  0.5* 8.1*  0.5* 13.0*  0.7*   MONO 7.4  0.5 6.5  0.4 5.9  0.3   EOSINOPHIL 0.0 0.0 0.2       COAG:    Recent Labs  Lab 01/11/17  1550   INR 1.0       CMP:   Recent Labs  Lab 01/12/17  0447 01/13/17  0524 01/13/17  1722 01/14/17  0509   * 95 142* 133*   CALCIUM 8.5* 8.2* 8.6* 8.4*   ALBUMIN 3.1*  2.9*  --  3.1*   PROT 5.8* 5.4*  --  5.7*    145 145 146*   K 3.4* 3.0* 4.6 3.3*   CO2 33* 36* 37* 39*    99 98 97   BUN 24 19 19 17   CREATININE 0.8 0.8 0.9 0.8   ALKPHOS 75 67  --  75   ALT 44 34  --  33   AST 27 23  --  24   BILITOT 1.0 1.0  --  0.9   MG 1.9 1.6  --  1.7     Estimated Creatinine Clearance: 76.6 mL/min (based on Cr of 0.8).    Diagnostic Results:    Telemetry: afib controlled rate      Scheduled Meds:   aspirin  81 mg Oral Daily    carvedilol  3.125 mg Oral BID    cefTRIAXone (ROCEPHIN) IVPB  1 g Intravenous Q24H    donepezil  10 mg Oral QHS    enoxaparin  40 mg Subcutaneous Daily    furosemide  60 mg Intravenous TID    ipratropium  0.5 mg Nebulization Q8H    levalbuterol  0.63 mg Nebulization Q8H    levothyroxine  125 mcg Oral Daily    metronidazole  500 mg Intravenous Q8H    miconazole nitrate 2%   Topical (Top) BID    pantoprazole  40 mg Oral Daily    sodium chloride 0.9%  3 mL Intravenous Q8H    vancomycin (VANCOCIN) IVPB  1,000 mg Intravenous Q24H     Continuous Infusions:   PRN Meds:hydrocodone-acetaminophen 7.5-325mg, senna-docusate 8.6-50 mg    Review of patient's allergies indicates:   Allergen Reactions    Pcn [penicillins] Hives and Other (See Comments)     unknow     Active Problem:   Patient Active Problem List   Diagnosis    Prostate cancer    History of radical prostatectomy (2002)    Hypothyroidism    Essential hypertension    Bilateral leg edema    Pulmonary hypertension    Heart failure with preserved ejection fraction, borderline, NYHA class I    Persistent atrial fibrillation    Acute encephalopathy    Normocytic anemia    PNA (pneumonia)    Acute on chronic diastolic CHF (congestive heart failure)    DNR (do not resuscitate)    Acute pulmonary edema    Goals of care, counseling/discussion    Dementia with behavioral disturbance         ASSESSMENT/PLAN:   89 y/o M with PMH sig for htn/HFpEF/hypothyroid presents with acute on chronic  "HFpEF with possible superimposed PNA, now with afib with controlled rate.       Acute on Chronic HFpEF: EF 45-50% which is slightly down from 55%, P HTN 56mmHg, RAP 8 after post aggressive diurese and bipap the other day  - restart lower dosage of coreg 3.125 bid, tolerating will increase to 6.25mg bid   - Now consistently in afib with may have also contributed, controlled rate.   - L pleural effusion on echo, may not be able to mobilize all this fluid      - anasarca up to his mid chest posteriorly resolving adequately, weight is down 12 lbs since admit     PNA: concern for HAP, will give course of 7 days of abx since he's from a nursing home, quite debiltated, anasarca and was hypotensive with florid volume overload which is mudding turner for possible infection, though he perked up with diuretics and abx so hard to tell which was the one that improved or combination of both     HTN: resume BB low dose 3.125 bid      Dementia with behavioral disturbance: donepezil    Hypothyroidism: synthroid, tsh 3.01 free t4 .86       Suspicion of aspiration resolved now that he's more mentally alert. Passed speech and swallow with consult service, no need for MBS at present, though swallow with wax and wane with mental status and delirium per S/S. Continue abx for now as he seems to be improving.       Pulm HTN: diurese, not candidate for vasodilator therapy      Hypokalemia/ Hypomag: replete     Dvt/gi prophy: protonix/ lovenox     Angled Linear 1" wide Ecchymotic area on his back: concern for some sort of restraint device, will look for documentation if present at the NH or upon admission.       Hypothermia: unable to maintain his how core body temperature.     Dispo: DNR order signed. family wants hospice. /  consulted and going to speak with family regarding hospice choices as he's already established at [facility] he maybe able to get services to start there and or transition to inpatient " hospice if they so choose.  Most likely will go back to NH in am.     Alena Ortiz NP  Hospital Medicine [facility]  Spectra link x [phone]  Pager: [phone]

## 2017-01-15 NOTE — PROGRESS NOTES
MD Lazaro called to see if can place order for stool sample since pT had diarrhea Bowel movement and on ABX. Stated yes. Telephone readback x2.

## 2017-01-15 NOTE — PLAN OF CARE
Problem: Fall Risk (Adult)  Goal: Absence of Falls  Patient will demonstrate the desired outcomes by discharge/transition of care.   Outcome: Ongoing (interventions implemented as appropriate)  Patient oriented to self only. IVPB antibiotics given per MD order. Pt diurese with IVP lasix. External catheter in place for accurate output. Pt. Remains free from falls/ injury/trauma. No complaints of CP, SOB, pain/discomfort. Plan of Care reviewed with patient. VSS and NADN. Will continue to monitor.

## 2017-01-15 NOTE — PROGRESS NOTES
01/15/17 0845   Height and Weight   Weight 60.4 kg (133 lb 2.5 oz)   Weight Method Standard Scale   pT critical Co2: 41 also reported to ROXI Ortiz with standing weight.

## 2017-01-16 VITALS
RESPIRATION RATE: 18 BRPM | BODY MASS INDEX: 20.31 KG/M2 | WEIGHT: 129.38 LBS | TEMPERATURE: 98 F | SYSTOLIC BLOOD PRESSURE: 112 MMHG | HEART RATE: 75 BPM | HEIGHT: 67 IN | OXYGEN SATURATION: 92 % | DIASTOLIC BLOOD PRESSURE: 75 MMHG

## 2017-01-16 LAB
ALBUMIN SERPL BCP-MCNC: 3.3 G/DL
ALP SERPL-CCNC: 75 U/L
ALT SERPL W/O P-5'-P-CCNC: 29 U/L
ANION GAP SERPL CALC-SCNC: 9 MMOL/L
AST SERPL-CCNC: 32 U/L
BACTERIA BLD CULT: NORMAL
BACTERIA BLD CULT: NORMAL
BILIRUB SERPL-MCNC: 0.9 MG/DL
BUN SERPL-MCNC: 12 MG/DL
C DIFF GDH STL QL: NEGATIVE
C DIFF TOX A+B STL QL IA: NEGATIVE
CALCIUM SERPL-MCNC: 9.1 MG/DL
CHLORIDE SERPL-SCNC: 90 MMOL/L
CO2 SERPL-SCNC: 43 MMOL/L
CREAT SERPL-MCNC: 0.8 MG/DL
E COLI SXT1 STL QL IA: NEGATIVE
E COLI SXT2 STL QL IA: NEGATIVE
EST. GFR  (AFRICAN AMERICAN): >60 ML/MIN/1.73 M^2
EST. GFR  (NON AFRICAN AMERICAN): >60 ML/MIN/1.73 M^2
GLUCOSE SERPL-MCNC: 117 MG/DL
MAGNESIUM SERPL-MCNC: 2 MG/DL
POTASSIUM SERPL-SCNC: 3.5 MMOL/L
PROT SERPL-MCNC: 5.9 G/DL
SODIUM SERPL-SCNC: 142 MMOL/L

## 2017-01-16 PROCEDURE — 80053 COMPREHEN METABOLIC PANEL: CPT

## 2017-01-16 PROCEDURE — 94640 AIRWAY INHALATION TREATMENT: CPT

## 2017-01-16 PROCEDURE — 25000003 PHARM REV CODE 250: Performed by: NURSE PRACTITIONER

## 2017-01-16 PROCEDURE — 36415 COLL VENOUS BLD VENIPUNCTURE: CPT

## 2017-01-16 PROCEDURE — 99239 HOSP IP/OBS DSCHRG MGMT >30: CPT | Mod: ,,, | Performed by: NURSE PRACTITIONER

## 2017-01-16 PROCEDURE — 25000003 PHARM REV CODE 250: Performed by: EMERGENCY MEDICINE

## 2017-01-16 PROCEDURE — 83735 ASSAY OF MAGNESIUM: CPT

## 2017-01-16 PROCEDURE — 63600175 PHARM REV CODE 636 W HCPCS: Performed by: NURSE PRACTITIONER

## 2017-01-16 PROCEDURE — 63600175 PHARM REV CODE 636 W HCPCS: Performed by: INTERNAL MEDICINE

## 2017-01-16 PROCEDURE — 94761 N-INVAS EAR/PLS OXIMETRY MLT: CPT

## 2017-01-16 PROCEDURE — 25000003 PHARM REV CODE 250: Performed by: INTERNAL MEDICINE

## 2017-01-16 RX ORDER — METOLAZONE 5 MG/1
5 TABLET ORAL DAILY PRN
Qty: 30 TABLET | Refills: 1 | Status: SHIPPED | OUTPATIENT
Start: 2017-01-16 | End: 2018-01-16

## 2017-01-16 RX ORDER — LEVALBUTEROL INHALATION SOLUTION 0.63 MG/3ML
0.63 SOLUTION RESPIRATORY (INHALATION) EVERY 8 HOURS
Qty: 270 ML | Refills: 11 | Status: SHIPPED | OUTPATIENT
Start: 2017-01-16 | End: 2018-01-16

## 2017-01-16 RX ORDER — LEVOTHYROXINE SODIUM 125 UG/1
125 TABLET ORAL DAILY
Qty: 30 TABLET | Refills: 1 | Status: SHIPPED | OUTPATIENT
Start: 2017-01-16 | End: 2018-01-16

## 2017-01-16 RX ORDER — PANTOPRAZOLE SODIUM 40 MG/1
40 TABLET, DELAYED RELEASE ORAL DAILY
Qty: 30 TABLET | Refills: 1 | Status: SHIPPED | OUTPATIENT
Start: 2017-01-16 | End: 2018-01-16

## 2017-01-16 RX ORDER — IPRATROPIUM BROMIDE 0.5 MG/2.5ML
500 SOLUTION RESPIRATORY (INHALATION) EVERY 8 HOURS
Qty: 225 ML | Refills: 11 | Status: SHIPPED | OUTPATIENT
Start: 2017-01-16 | End: 2018-01-16

## 2017-01-16 RX ORDER — FUROSEMIDE 40 MG/1
40 TABLET ORAL DAILY
Status: DISCONTINUED | OUTPATIENT
Start: 2017-01-16 | End: 2017-01-16 | Stop reason: HOSPADM

## 2017-01-16 RX ORDER — CARVEDILOL 6.25 MG/1
6.25 TABLET ORAL 2 TIMES DAILY
Qty: 60 TABLET | Refills: 1 | Status: SHIPPED | OUTPATIENT
Start: 2017-01-16 | End: 2018-01-16

## 2017-01-16 RX ORDER — FUROSEMIDE 40 MG/1
40 TABLET ORAL DAILY
Qty: 30 TABLET | Refills: 1 | Status: SHIPPED | OUTPATIENT
Start: 2017-01-16 | End: 2018-01-16

## 2017-01-16 RX ADMIN — CARVEDILOL 6.25 MG: 6.25 TABLET, FILM COATED ORAL at 08:01

## 2017-01-16 RX ADMIN — LEVALBUTEROL HYDROCHLORIDE 0.63 MG: 0.63 SOLUTION RESPIRATORY (INHALATION) at 12:01

## 2017-01-16 RX ADMIN — LEVALBUTEROL HYDROCHLORIDE 0.63 MG: 0.63 SOLUTION RESPIRATORY (INHALATION) at 04:01

## 2017-01-16 RX ADMIN — FUROSEMIDE 40 MG: 40 TABLET ORAL at 08:01

## 2017-01-16 RX ADMIN — LEVALBUTEROL HYDROCHLORIDE 0.63 MG: 0.63 SOLUTION RESPIRATORY (INHALATION) at 09:01

## 2017-01-16 RX ADMIN — METRONIDAZOLE 500 MG: 500 INJECTION, SOLUTION INTRAVENOUS at 05:01

## 2017-01-16 RX ADMIN — METOLAZONE 5 MG: 5 TABLET ORAL at 08:01

## 2017-01-16 RX ADMIN — CEFTRIAXONE 1 G: 1 INJECTION, SOLUTION INTRAVENOUS at 04:01

## 2017-01-16 RX ADMIN — IPRATROPIUM BROMIDE 0.5 MG: 0.5 SOLUTION RESPIRATORY (INHALATION) at 09:01

## 2017-01-16 RX ADMIN — Medication 3 ML: at 06:01

## 2017-01-16 RX ADMIN — METRONIDAZOLE 500 MG: 500 INJECTION, SOLUTION INTRAVENOUS at 06:01

## 2017-01-16 RX ADMIN — PANTOPRAZOLE SODIUM 40 MG: 40 TABLET, DELAYED RELEASE ORAL at 08:01

## 2017-01-16 RX ADMIN — IPRATROPIUM BROMIDE 0.5 MG: 0.5 SOLUTION RESPIRATORY (INHALATION) at 04:01

## 2017-01-16 RX ADMIN — FUROSEMIDE 60 MG: 10 INJECTION, SOLUTION INTRAVENOUS at 06:01

## 2017-01-16 RX ADMIN — ENOXAPARIN SODIUM 40 MG: 100 INJECTION SUBCUTANEOUS at 11:01

## 2017-01-16 RX ADMIN — LEVOTHYROXINE SODIUM 125 MCG: 25 TABLET ORAL at 06:01

## 2017-01-16 RX ADMIN — Medication 3 ML: at 02:01

## 2017-01-16 RX ADMIN — VANCOMYCIN HYDROCHLORIDE 1000 MG: 1 INJECTION, POWDER, LYOPHILIZED, FOR SOLUTION INTRAVENOUS at 02:01

## 2017-01-16 RX ADMIN — ASPIRIN 81 MG CHEWABLE TABLET 81 MG: 81 TABLET CHEWABLE at 08:01

## 2017-01-16 RX ADMIN — IPRATROPIUM BROMIDE 0.5 MG: 0.5 SOLUTION RESPIRATORY (INHALATION) at 12:01

## 2017-01-16 NOTE — PROGRESS NOTES
JONATHAN Dangelo called for update on pT d/c with NH and Hospice. Stated would call back with update.

## 2017-01-16 NOTE — PROGRESS NOTES
ROXI Ortiz consulted about ordered miconazole cream. Stated okay to hold if no fungal infection present. pT assessed and no fungal infection present.

## 2017-01-16 NOTE — PLAN OF CARE
Per MSW notes, nsg home/ hospice. Referrals in process.  Future Appointments  Date Time Provider Department Center   1/25/2017 10:45 AM León Benson Jr., MD [facility] UROLOGY [facility]        01/16/17 1207   Final Note   Assessment Type Final Discharge Note   Discharge Disposition HospiceHome   Discharge planning education complete? Yes   Discharge plans and expectations educations in teach back method with documentation complete? Yes   Discharge/Hospital Encounter Summary to (non-[facility]) PCP n/a   Referral to Outpatient Case Management complete? n/a   Referral to / orders for Home Health Complete? Yes   30 day supply of medicines given at discharge, if documented non-compliance / non-adherence? n/a   Any social issues identified prior to discharge? No   Did you assess the readiness or willingness of the family or caregiver to support self management of care? Yes

## 2017-01-16 NOTE — DISCHARGE SUMMARY
[facility] Hospital Medicine  Discharge Summary      Patient Name: Hardik Marquez  MRN: [id number]  Admission Date: 1/11/2017  Hospital Length of Stay: 5 days  Discharge Date and Time: 1/16/2017 12:31 PM  Attending Physician: Jarett Yanes MD   Discharging Provider: Jesica Ortiz NP  Primary Care Provider: SHIRA Greco MD    [facility] Medicine Team: [facility]  Jesica Ortiz NP    HPI: 89 y.o. male presents with bilateral le edema and respiratory distress with audible crackles from his nursing home. Upon assessment in his room he is aao x 3 but cannot communicate adequately due to his audible crackles and inability to speak in full sentences. His troponin is 0.097 but is chronically elevated last 0.127. Heis BNP is 695 where it was 8-900 prior. He has bilateral le pitting edema to his abdomen and bibasilar crackles with coarse breath sounds throughout.     PMH sig for HFpEF, htn, hypothyroidism.    * No surgery found *      Indwelling Lines/Drains at time of discharge:   Lines/Drains/Airways     Drain                 External Urinary Catheter 01/11/17 1600 4 days              Hospital Course: 89 y/o M with PMH sig for htn/HFpEF/hypothyroid presents with acute on chronic HFpEF with possible superimposed PNA, now with afib with controlled rate.       Acute on Chronic HFpEF: EF 45-50% which is slightly down from 55%, P HTN 56mmHg, RAP 8 after post aggressive diurese and bipap the other day  - changed atenolol to coreg 6.25mg bid and tolerating  - Now consistently in afib with may have also contributed, controlled rate.   - L pleural effusion on echo, may not be able to mobilize all this fluid    - anasarca up to his mid chest posteriorly resolving adequately  -  weight is down 16 lbs since admit, blisters still evident on R toes, resolving on chins and L foot      PNA: concern for HAP, seemed to have responded to the fluid removal and weight loss more so than the abx.  He  "completed 5 doses and will be going back to NH with hospice care.       HTN: coreg 6.25mg bid      Dementia with behavioral disturbance: donepezil     Hypothyroidism: synthroid, tsh 3.01 free t4 .86       Suspicion of aspiration resolved now that he's more mentally alert. Passed speech and swallow with consult service, no need for MBS at present, though swallow with wax and wane with mental status and delirium per S/S.        Pulm HTN: diurese, not candidate for vasodilator therapy      Hypokalemia/ Hypomag: replete      Dvt/gi prophy: protonix/ lovenox      Angled Linear 1" wide Ecchymotic area on his back: concern for some sort of restraint device, will look for documentation if present at the NH or upon admission.       Hypothermia: unable to maintain his how core body temperature.      Dispo: DNR order signed. family wants hospice. /  consulted and spoke with family regarding hospice choices as he's already established at [facility] he will be able to get services to start there and or transition to inpatient hospice if they so choose.        Consults:   Consults         Status Ordering Provider     Inpatient consult to Social Work  Once     Provider:  (Not yet assigned)    Acknowledged NIRAV ABDI          Significant Diagnostic Studies: Labs:   BMP:   Recent Labs  Lab 01/15/17  0727 01/16/17  0651   * 117*    142   K 3.5 3.5   CL 92* 90*   CO2 41* 43*   BUN 12 12   CREATININE 0.7 0.8   CALCIUM 8.5* 9.1   MG 2.1 2.0   , CMP   Recent Labs  Lab 01/15/17  0727 01/16/17  0651    142   K 3.5 3.5   CL 92* 90*   CO2 41* 43*   * 117*   BUN 12 12   CREATININE 0.7 0.8   CALCIUM 8.5* 9.1   PROT 6.0 5.9*   ALBUMIN 3.3* 3.3*   BILITOT 1.0 0.9   ALKPHOS 74 75   AST 27 32   ALT 32 29   ANIONGAP 10 9   ESTGFRAFRICA >60.0 >60.0   EGFRNONAA >60.0 >60.0   , CBC No results for input(s): WBC, HGB, HCT, PLT in the last 48 hours., INR   Lab Results   Component Value Date    " INR 1.0 01/11/2017    INR 1.0 11/11/2016    INR 1.9 (H) 01/17/2005   , Lipid Panel   Lab Results   Component Value Date    CHOL 153 01/29/2015    HDL 66 01/29/2015    LDLCALC 74.2 01/29/2015    TRIG 64 01/29/2015    CHOLHDL 43.1 01/29/2015    and Troponin   Recent Labs  Lab 01/12/17  1025   TROPONINI 0.100*     Microbiology:   Blood Culture   Lab Results   Component Value Date    LABBLOO No Growth to date 01/12/2017    LABBLOO No Growth to date 01/12/2017    LABBLOO No Growth to date 01/12/2017    LABBLOO No Growth to date 01/12/2017    LABBLOO No Growth to date 01/12/2017    LABBLOO No Growth to date 01/12/2017    LABBLOO No Growth to date 01/12/2017    LABBLOO No Growth to date 01/12/2017    LABBLOO No Growth to date 01/12/2017    LABBLOO No Growth to date 01/12/2017   , Sputum Culture No results found for: GSRESP, RESPIRATORYC and Urine Culture    Lab Results   Component Value Date    LABURIN No growth 12/08/2016       Pending Diagnostic Studies:     None        Final Active Diagnoses:    Diagnosis Date Noted POA    PRINCIPAL PROBLEM:  Acute on chronic diastolic CHF (congestive heart failure) [I50.33] 12/09/2016 Yes    Endogenous hypothermia [R68.0] 01/15/2017 Yes    Dementia with behavioral disturbance [F03.91] 01/14/2017 Yes     Chronic    Goals of care, counseling/discussion [Z71.89]  Not Applicable    Acute pulmonary edema [J81.0] 01/11/2017 Yes    DNR (do not resuscitate) [Z66]  Yes    Persistent atrial fibrillation [I48.1] 10/11/2016 Yes    Bilateral leg edema [R60.0] 06/23/2016 Yes    Pulmonary hypertension [I27.2] 06/23/2016 Yes     Chronic    Essential hypertension [I10] 02/05/2015 Yes    Hypothyroidism [E03.9] 02/05/2015 Yes     Chronic      Problems Resolved During this Admission:    Diagnosis Date Noted Date Resolved POA      Discharged Condition: fair    Disposition: Hospice/Medical Facility    Follow Up: no f/u as he's hospice    Patient Instructions: low sodium diet do not push fluids      Diet general   Order Specific Question Answer Comments   Fluid restriction: Fluid - 1500mL    Additional restrictions: Low Sodium,2gm      Activity as tolerated     Call MD for:  temperature >100.4     Call MD for:  persistent nausea and vomiting or diarrhea     Call MD for:  severe uncontrolled pain     Call MD for:  redness, tenderness, or signs of infection (pain, swelling, redness, odor or green/yellow discharge around incision site)     Call MD for:  difficulty breathing or increased cough     Call MD for:  severe persistent headache     Call MD for:  worsening rash     Call MD for:  persistent dizziness, light-headedness, or visual disturbances     Call MD for:  increased confusion or weakness       Medications:  Reconciled Home Medications:   Current Discharge Medication List      START taking these medications    Details   carvedilol (COREG) 6.25 MG tablet Take 1 tablet (6.25 mg total) by mouth 2 (two) times daily.  Qty: 60 tablet, Refills: 1      ipratropium (ATROVENT) 0.02 % nebulizer solution Take 2.5 mLs (500 mcg total) by nebulization every 8 (eight) hours.  Qty: 225 mL, Refills: 11      levalbuterol (XOPENEX) 0.63 mg/3 mL nebulizer solution Take 3 mLs (0.63 mg total) by nebulization every 8 (eight) hours.  Qty: 270 mL, Refills: 11      metOLazone (ZAROXOLYN) 5 MG tablet Take 1 tablet (5 mg total) by mouth daily as needed (if lasix ineffective may add 30 min prior to lasix).  Qty: 30 tablet, Refills: 1         CONTINUE these medications which have CHANGED    Details   furosemide (LASIX) 40 MG tablet Take 1 tablet (40 mg total) by mouth once daily.  Qty: 30 tablet, Refills: 1      levothyroxine (SYNTHROID) 125 MCG tablet Take 1 tablet (125 mcg total) by mouth once daily.  Qty: 30 tablet, Refills: 1      pantoprazole (PROTONIX) 40 MG tablet Take 1 tablet (40 mg total) by mouth once daily.  Qty: 30 tablet, Refills: 1         CONTINUE these medications which have NOT CHANGED    Details   aspirin 81 MG  Chew Take 1 tablet (81 mg total) by mouth once daily.  Refills: 0      donepezil (ARICEPT) 5 MG tablet Take 10 mg by mouth every evening.       hydrocodone-acetaminophen 7.5-325mg (NORCO) 7.5-325 mg per tablet Take 1 tablet by mouth every 4 (four) hours as needed for Pain.      miconazole nitrate 2% (MICOTIN) 2 % Oint Apply topically 2 (two) times daily.  Refills: 0         STOP taking these medications       atenolol (TENORMIN) 25 MG tablet Comments:   Reason for Stopping:             Time spent on the discharge of patient: 45 minutes    Jesica Ortiz NP  Department of Hospital Medicine  [facility]

## 2017-01-16 NOTE — PLAN OF CARE
Problem: Patient Care Overview  Goal: Plan of Care Review  Outcome: Revised  Patient is ready for discharge. Treatment plan implemented. Report called to [facility] Dangel[facility] nurse. Patient stable. IVs removed. AXBs given per order. Sacrum mepilex changed. Both leg wound care implemented. No complaints of pain.  Reviewed medications and side effects, appointments, and answered questions with patient and family. Will continue to assess pT and implement orders.

## 2017-01-16 NOTE — PROGRESS NOTES
ROXI Ortiz called to report critical CO2. pT palliative care. Will continue to assess pT and implement orders.

## 2017-01-16 NOTE — PLAN OF CARE
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       Hospice Qualifying Diagnoses: End stage heart failure/ dementia/ hypothermia       Patient has a life expectancy < 6 months due to these conditions.    Vital Signs: Routine per Hospice Protocol.    Allergies:  Review of patient's allergies indicates:   Allergen Reactions    Pcn [penicillins] Hives and Other (See Comments)     unknow       Diet: low sodium    Activities: As tolerated    Nursing: Per Hospice Routine    Future Orders:  Hospice Medical Director may dictate new orders for comfortable care measures & sign death certificate.    Medications:            Hardik Marquez   Home Medication Instructions STEFFI:[id number]    Printed on:01/16/17 8995   Medication Information                      aspirin 81 MG Chew  Take 1 tablet (81 mg total) by mouth once daily.             carvedilol (COREG) 6.25 MG tablet  Take 1 tablet (6.25 mg total) by mouth 2 (two) times daily.             donepezil (ARICEPT) 5 MG tablet  Take 10 mg  by mouth every evening.              furosemide (LASIX) 40 MG tablet  Take 1 tablet (40 mg total) by mouth once daily.             hydrocodone-acetaminophen 7.5-325mg (NORCO) 7.5-325 mg per tablet  Take 1 tablet by mouth every 4 (four) hours as needed for Pain.             ipratropium (ATROVENT) 0.02 % nebulizer solution  Take 2.5 mLs (500 mcg total) by nebulization every 8 (eight) hours.             levalbuterol (XOPENEX) 0.63 mg/3 mL nebulizer solution  Take 3 mLs (0.63 mg total) by nebulization every 8 (eight) hours.             levothyroxine (SYNTHROID) 125 MCG tablet  Take 1 tablet (125 mcg total) by mouth once daily.             metOLazone (ZAROXOLYN) 5 MG tablet  Take 1 tablet (5 mg total) by mouth daily as needed (if lasix ineffective may add 30 min prior to lasix).             miconazole nitrate 2% (MICOTIN) 2 % Oint  Apply topically 2 (two) times daily.             pantoprazole (PROTONIX) 40 MG tablet  Take 1 tablet (40 mg total) by mouth once daily.                 _________________________________  Jesica Ortiz NP  01/16/2017

## 2017-01-16 NOTE — PT/OT/SLP PROGRESS
Physical Therapy      Hardik Marquez  MRN: [id number]    Orders received by PT and chart review performed. However, prior to seeing pt PT spoke with Jesica Ortiz NP who stated orders could be discontinued 2* pt transitioning to hospice care.    Alessandra Abbasi, PT, DPT   1/16/2017  [phone]

## 2017-01-16 NOTE — PLAN OF CARE
Problem: Patient Care Overview  Goal: Plan of Care Review  Outcome: Ongoing (interventions implemented as appropriate)  Pt. Remains free from falls/ injury/trauma. No complaints of CP, SOB, pain/discomfort. IV antibiotics given per MD order. Plan of Care reviewed with patient. VSS and NADN. Will continue to monitor.

## 2017-01-16 NOTE — PLAN OF CARE
received referral for hospice care.  Met with pt and pts son, Kane Marquez ([phone]) at the bedside to provide him with a list of hospice agencies.  Son would like pt to return to [facility] with hospice.  Spoke to admissions at [facility] who states hospice consult can be included on transfer orders when pt is discharged.   will make a tentative referral to [facility] in right care.  Will follow.

## 2017-01-17 LAB
BACTERIA BLD CULT: NORMAL
BACTERIA BLD CULT: NORMAL

## 2017-01-18 LAB — BACTERIA STL CULT: NORMAL

## 2017-04-17 PROBLEM — J81.0 ACUTE PULMONARY EDEMA: Status: RESOLVED | Noted: 2017-01-11 | Resolved: 2017-04-17

## 2017-06-01 ENCOUNTER — HOSPITAL ENCOUNTER (EMERGENCY)
Facility: HOSPITAL | Age: 82
Discharge: HOME | End: 2017-06-01
Attending: EMERGENCY MEDICINE
Payer: MEDICARE

## 2017-06-01 VITALS
RESPIRATION RATE: 18 BRPM | OXYGEN SATURATION: 100 % | BODY MASS INDEX: 20.09 KG/M2 | HEART RATE: 73 BPM | TEMPERATURE: 98 F | HEIGHT: 67 IN | SYSTOLIC BLOOD PRESSURE: 124 MMHG | DIASTOLIC BLOOD PRESSURE: 74 MMHG | WEIGHT: 128 LBS

## 2017-06-01 DIAGNOSIS — N39.0 URINARY TRACT INFECTION WITHOUT HEMATURIA, SITE UNSPECIFIED: Primary | ICD-10-CM

## 2017-06-01 DIAGNOSIS — Z51.5: ICD-10-CM
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
## 2017-06-01 DIAGNOSIS — R41.0 DELIRIUM: ICD-10-CM

## 2017-06-01 LAB
ALBUMIN SERPL BCP-MCNC: 3.3 G/DL
ALP SERPL-CCNC: 86 U/L
ALT SERPL W/O P-5'-P-CCNC: 26 U/L
ANION GAP SERPL CALC-SCNC: 7 MMOL/L
AST SERPL-CCNC: 29 U/L
BACTERIA #/AREA URNS HPF: ABNORMAL /HPF
BASOPHILS # BLD AUTO: 0.02 K/UL
BASOPHILS NFR BLD: 0.3 %
BILIRUB SERPL-MCNC: 0.6 MG/DL
BILIRUB UR QL STRIP: NEGATIVE
BUN SERPL-MCNC: 29 MG/DL
CALCIUM SERPL-MCNC: 8.6 MG/DL
CHLORIDE SERPL-SCNC: 97 MMOL/L
CLARITY UR: CLEAR
CO2 SERPL-SCNC: 33 MMOL/L
COLOR UR: YELLOW
CREAT SERPL-MCNC: 0.9 MG/DL
DIFFERENTIAL METHOD: ABNORMAL
EOSINOPHIL # BLD AUTO: 0.1 K/UL
EOSINOPHIL NFR BLD: 0.8 %
ERYTHROCYTE [DISTWIDTH] IN BLOOD BY AUTOMATED COUNT: 17.5 %
EST. GFR  (AFRICAN AMERICAN): >60 ML/MIN/1.73 M^2
EST. GFR  (NON AFRICAN AMERICAN): >60 ML/MIN/1.73 M^2
GLUCOSE SERPL-MCNC: 112 MG/DL
GLUCOSE UR QL STRIP: NEGATIVE
HCT VFR BLD AUTO: 28.3 %
HGB BLD-MCNC: 9.5 G/DL
HGB UR QL STRIP: ABNORMAL
KETONES UR QL STRIP: NEGATIVE
LEUKOCYTE ESTERASE UR QL STRIP: ABNORMAL
LYMPHOCYTES # BLD AUTO: 1 K/UL
LYMPHOCYTES NFR BLD: 14.8 %
MCH RBC QN AUTO: 30.6 PG
MCHC RBC AUTO-ENTMCNC: 33.6 %
MCV RBC AUTO: 91 FL
MICROSCOPIC COMMENT: ABNORMAL
MONOCYTES # BLD AUTO: 0.4 K/UL
MONOCYTES NFR BLD: 5.9 %
NEUTROPHILS # BLD AUTO: 5.1 K/UL
NEUTROPHILS NFR BLD: 77.4 %
NITRITE UR QL STRIP: POSITIVE
PH UR STRIP: 6 [PH] (ref 5–8)
PLATELET # BLD AUTO: 121 K/UL
PMV BLD AUTO: 9 FL
POTASSIUM SERPL-SCNC: 3.6 MMOL/L
PROT SERPL-MCNC: 6 G/DL
PROT UR QL STRIP: ABNORMAL
RBC # BLD AUTO: 3.1 M/UL
RBC #/AREA URNS HPF: 3 /HPF (ref 0–4)
SODIUM SERPL-SCNC: 137 MMOL/L
SP GR UR STRIP: 1.02 (ref 1–1.03)
URN SPEC COLLECT METH UR: ABNORMAL
UROBILINOGEN UR STRIP-ACNC: NEGATIVE EU/DL
WBC # BLD AUTO: 6.63 K/UL
WBC #/AREA URNS HPF: 5 /HPF (ref 0–5)

## 2017-06-01 PROCEDURE — 81000 URINALYSIS NONAUTO W/SCOPE: CPT

## 2017-06-01 PROCEDURE — 93010 ELECTROCARDIOGRAM REPORT: CPT | Mod: GW,,, | Performed by: INTERNAL MEDICINE

## 2017-06-01 PROCEDURE — 87186 SC STD MICRODIL/AGAR DIL: CPT

## 2017-06-01 PROCEDURE — 87088 URINE BACTERIA CULTURE: CPT

## 2017-06-01 PROCEDURE — 93005 ELECTROCARDIOGRAM TRACING: CPT

## 2017-06-01 PROCEDURE — 87077 CULTURE AEROBIC IDENTIFY: CPT

## 2017-06-01 PROCEDURE — 85025 COMPLETE CBC W/AUTO DIFF WBC: CPT

## 2017-06-01 PROCEDURE — 87086 URINE CULTURE/COLONY COUNT: CPT

## 2017-06-01 PROCEDURE — 80053 COMPREHEN METABOLIC PANEL: CPT

## 2017-06-01 PROCEDURE — 99291 CRITICAL CARE FIRST HOUR: CPT

## 2017-06-01 PROCEDURE — 99285 EMERGENCY DEPT VISIT HI MDM: CPT

## 2017-06-01 PROCEDURE — 25000003 PHARM REV CODE 250: Performed by: EMERGENCY MEDICINE

## 2017-06-01 RX ORDER — CIPROFLOXACIN 500 MG/1
500 TABLET ORAL
Status: COMPLETED | OUTPATIENT
Start: 2017-06-01 | End: 2017-06-01

## 2017-06-01 RX ORDER — CIPROFLOXACIN 500 MG/1
500 TABLET ORAL 2 TIMES DAILY
Qty: 20 TABLET | Refills: 0 | Status: SHIPPED | OUTPATIENT
Start: 2017-06-01 | End: 2017-06-11

## 2017-06-01 RX ORDER — CIPROFLOXACIN 2 MG/ML
400 INJECTION, SOLUTION INTRAVENOUS
Status: DISCONTINUED | OUTPATIENT
Start: 2017-06-01 | End: 2017-06-01

## 2017-06-01 RX ADMIN — CIPROFLOXACIN 500 MG: 500 TABLET, FILM COATED ORAL at 05:06

## 2017-06-01 RX ADMIN — BACITRACIN, NEOMYCIN, POLYMYXIN B 1 EACH: 400; 3.5; 5 OINTMENT TOPICAL at 05:06

## 2017-06-01 NOTE — ED PROVIDER NOTES
Encounter Date: 6/1/2017       History     Chief Complaint   Patient presents with    Fall     sent from [facility] following fall from wheelchair. Presents with abrasions to forehead and to left hand. Pt reports chronic intermittent pain across his waist, but denies any pain at this time. Denies LOC.      Review of patient's allergies indicates:   Allergen Reactions    Pcn [penicillins] Hives and Other (See Comments)     unknow     89 yo M presents to the ED after a fall. He tells me it was an unintentional fall, while he was on the rocking chair. Doesn't remember getting up. He is quite tangential throughout the conversation. He denies complaint. Has an abrasion over his nose and forehead.     He is DNR/DNI.  According to his nurse, he has been confused over the past few days. He is on hospice, but according to the Director of Nursing- he was sent over here with concern for head injury. According to her, this is the policy at the Nursing Facility.       The history is provided by the nursing home and the patient. The history is limited by a language barrier.   Fall   The accident occurred just prior to arrival. Fall occurred: on a rocking chair. He fell from a height of 3 to 5 ft. He landed on carpet. The point of impact was the head. The pain is at a severity of 0/10. Pertinent negatives include no neck pain and no back pain.     Past Medical History:   Diagnosis Date    Basal cell carcinoma 04/20/2016    Scalp    DNR (do not resuscitate)     Hypertension     Hypothyroidism     Normocytic anemia 11/12/2016    Prostate cancer      Past Surgical History:   Procedure Laterality Date    BACK SURGERY      COCCYX AS  A CHILD    CATARACT EXTRACTION, BILATERAL      HERNIA REPAIR      X 2    PROSTATE SURGERY      PROSTATECTOMY      SKIN CANCER EXCISION      EAR RIGHT, LEFT JAW, AND RIGHT ARM     Family History   Problem Relation Age of Onset    Skin cancer Father     Stroke Sister 93    Anesthesia  problems Neg Hx     Cancer Neg Hx     Clotting disorder Neg Hx      problems Neg Hx     Heart disease Neg Hx     Hypertension Neg Hx     Kidney cancer Neg Hx     Kidney disease Neg Hx     Malignant hyperthermia Neg Hx     Prostate cancer Neg Hx     Sickle cell trait Neg Hx     Lupus Neg Hx     Sudden death Neg Hx     Urolithiasis Neg Hx     Melanoma Neg Hx      Social History   Substance Use Topics    Smoking status: Never Smoker    Smokeless tobacco: Never Used    Alcohol use No     Review of Systems   Unable to perform ROS: Mental status change   Eyes: Negative.    Endocrine: Negative.    Musculoskeletal: Negative for back pain and neck pain.   Allergic/Immunologic: Negative.    All other systems reviewed and are negative.      Physical Exam     Initial Vitals [06/01/17 1440]   BP Pulse Resp Temp SpO2   113/62 60 16 98.4 °F (36.9 °C) 99 %     Physical Exam    Nursing note and vitals reviewed.  Constitutional: He appears well-developed and well-nourished.   He is thin, undernourished, but well appearing in no acute distress   HENT:   Head: Normocephalic.   Mouth/Throat: Oropharynx is clear and moist.   No oropharyngeal trauma, no dental trauma   Eyes: EOM are normal. Pupils are equal, round, and reactive to light.   Neck: Normal range of motion.   Cardiovascular: Normal rate.   Pulmonary/Chest: Breath sounds normal.   Abdominal: Soft. Bowel sounds are normal.   Slightly distended, non tender   Musculoskeletal: Normal range of motion.   Neurological: He is alert and oriented to person, place, and time. He has normal strength.   Skin: Skin is warm and dry.   Small contusion over the bridge of his nose. No bony defect. Small contusion over forehead. No bony defect or deformity       Psychiatric: His behavior is normal. Thought content normal. His affect is labile. His speech is tangential. He is inattentive.         ED Course   Critical Care  Date/Time: 6/1/2017 4:18 PM  Performed by: TALON RIVERA  MELONY.  Authorized by: TALON RIVERA   Direct patient critical care time: 8 minutes  Additional history critical care time: 8 minutes  Ordering / reviewing critical care time: 8 minutes  Documentation critical care time: 8 minutes  Consulting other physicians critical care time: 6 minutes  Total critical care time (exclusive of procedural time) : 38 minutes  Critical care time was exclusive of separately billable procedures and treating other patients and teaching time.  Critical care was necessary to treat or prevent imminent or life-threatening deterioration of the following conditions: sepsis.  Critical care was time spent personally by me on the following activities: discussions with consultants, interpretation of cardiac output measurements, evaluation of patient's response to treatment, examination of patient, obtaining history from patient or surrogate, ordering and performing treatments and interventions, ordering and review of laboratory studies, ordering and review of radiographic studies, re-evaluation of patient's condition and review of old charts.        Labs Reviewed   COMPREHENSIVE METABOLIC PANEL - Abnormal; Notable for the following:        Result Value    CO2 33 (*)     Glucose 112 (*)     Calcium 8.6 (*)     Albumin 3.3 (*)     Anion Gap 7 (*)     All other components within normal limits   CBC W/ AUTO DIFFERENTIAL - Abnormal; Notable for the following:     RBC 3.10 (*)     Hemoglobin 9.5 (*)     Hematocrit 28.3 (*)     RDW 17.5 (*)     Platelets 121 (*)     MPV 9.0 (*)     Gran% 77.4 (*)     Lymph% 14.8 (*)     All other components within normal limits   URINALYSIS - Abnormal; Notable for the following:     Protein, UA Trace (*)     Occult Blood UA Trace (*)     Nitrite, UA Positive (*)     Leukocytes, UA Trace (*)     All other components within normal limits   URINALYSIS MICROSCOPIC - Abnormal; Notable for the following:     Bacteria, UA Moderate (*)     All other components within normal  limits   CULTURE, URINE             Medical Decision Making:   Initial Assessment:   89 yo M presents to the ED after a fall on a rocking chair. Patient has some underlying dementia, unclear if this is his baseline- but has some tangential though processes and inattention  Differential Diagnosis:   Medical: renal failure, uremia, urinary retention, UTI, infectious  ICH: given fall on aspirin, confusion and language barrier    He has normal vital signs, HR 60, /62 and afebrile, normal oxygen saturations and RR  EKG, troponin, CBC, CMP and UA to evaluate for medical causes of fall  HCT all that is needed for trauma evaluation  He has 5/5 strength of his upper extremities  ED Management:  Evaluation in the ED significant for nitrate positive UTI, likely contributing to patient's fall   Has never had UTI's with positive urine cultures previously, he has a PCN allergy  HCT normal, no metabolic derangements    I spoke with Irene Jimmy, as well as the nursing facility. He is on hospice, but was sent in with concern for head injury. Irene and her  do not want Hardik admitted, but they do agree with oral antibiotics for his UTI.       HIS DISCHARGE INSTRUCTIONS:     PLEASE GIVE HARDIK ANTIBIOTICS FOR HIS URINARY TRACT INFECTION. HE WAS SEEN IN THE EMERGENCY DEPARTMENT AFTER HIS FALL. HE HAS NO SIGNIFICANT INJURY TO HIS HEAD. HE DOES HAVE A URINARY TRACT INFECTION. I SPOKE WITH IRENE FLORIAN, AND THEY WOULD LIKE TO SEND HARDIK BACK TO THE FACILITY ON ORAL ANTIBIOTICS FOR HIS URINARY TRACT INFECTION.                        ED Course     Clinical Impression:   The primary encounter diagnosis was Urinary tract infection without hematuria, site unspecified. Diagnoses of Fall, Resides in hospice, and Delirium were also pertinent to this visit.          Mary Caceres MD  06/01/17 5703

## 2017-06-01 NOTE — ED NOTES
Pt presents to ED via EMS from [facility] with c/o fall out of wheelchair. Skin tear noted to left hand, abrasion to nose and forehead. Denies LOC, no hematoma or swelling to any areas. Pt also arrives with extensive dressings to bilateral lower legs. Pt mostly Mongolian speaking and disoriented. NAD noted. VSS. Will continue to monitor. Pt denies any pain at this time. Side rails up x 2, bed in low locked position. Call light within reach.

## 2017-06-01 NOTE — DISCHARGE INSTRUCTIONS
PLEASE GIVE ESTELLE ANTIBIOTICS FOR HIS URINARY TRACT INFECTION. HE WAS SEEN IN THE EMERGENCY DEPARTMENT AFTER HIS FALL. HE HAS NO SIGNIFICANT INJURY TO HIS HEAD. HE DOES HAVE A URINARY TRACT INFECTION. I SPOKE WITH VALERIE FLORIAN, AND THEY WOULD LIKE TO SEND ESTELLE BACK TO THE FACILITY ON ORAL ANTIBIOTICS FOR HIS URINARY TRACT INFECTION.

## 2017-06-04 LAB — BACTERIA UR CULT: NORMAL

## 2017-06-04 NOTE — PROVIDER PROGRESS NOTES - EMERGENCY DEPT.
Encounter Date: 6/1/2017    ED Physician Progress Notes         06/04/2017 3:27 PM patient was sent home with Cipro however, culture is resistant to this.  Culture is sensitive to Bactrim this was relayed to [facility] where patient is currently residing.  Results were faxed via nurse Owens. JOEL

## 2017-06-14 ENCOUNTER — TELEPHONE (OUTPATIENT)
Dept: CARDIOLOGY | Facility: CLINIC | Age: 82
End: 2017-06-14

## 2017-06-14 NOTE — TELEPHONE ENCOUNTER
Called patient's daughter to schedule f/u for Mr. Marquez.  Daughter states he is currently on hospice.  Instructed to call if they need anything further.

## 2017-06-21 ENCOUNTER — HOSPITAL ENCOUNTER (EMERGENCY)
Facility: HOSPITAL | Age: 82
End: 2017-06-21
Attending: EMERGENCY MEDICINE
Payer: MEDICARE

## 2017-06-21 VITALS
RESPIRATION RATE: 20 BRPM | WEIGHT: 130 LBS | DIASTOLIC BLOOD PRESSURE: 80 MMHG | OXYGEN SATURATION: 100 % | SYSTOLIC BLOOD PRESSURE: 129 MMHG | HEART RATE: 66 BPM | BODY MASS INDEX: 20.4 KG/M2 | TEMPERATURE: 91 F | HEIGHT: 67 IN

## 2017-06-21 DIAGNOSIS — S51.019A SKIN TEAR OF ELBOW WITHOUT COMPLICATION, UNSPECIFIED LATERALITY, INITIAL ENCOUNTER: Primary | ICD-10-CM
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
## 2017-06-21 PROCEDURE — 99284 EMERGENCY DEPT VISIT MOD MDM: CPT

## 2017-06-21 NOTE — ED TRIAGE NOTES
pt. sent from  [facility] home for eval of unwitnessed fall. pt. found on floor in his room. he has abrasions to bilateral elbows. the pt. has severe dementia and speaks English only.  Patients legs are both wrapped. Left foot looks like it could be infected

## 2017-06-21 NOTE — ED NOTES
Spoke with Amanda at [facility] to give report. Will call [facility] for transport

## 2017-06-21 NOTE — ED PROVIDER NOTES
Encounter Date: 6/21/2017       History     Chief Complaint   Patient presents with    Fall     pt. sent from  [facility] for eval of unwitnessed fall. pt. found on floor in his room. he has abrasions to bilateral elbows. the pt. has severe dementia and speaks Romansh only.      The patient has a history of dementia.  He fell out of his bed unwitnessed at a nursing home today.  He has several skin tears to his bilateral elbows.  There was no obvious head trauma.  He is currently at his neurologic baseline.  The patient is alert and follows commands.    This represented Limited history of present illness and review of systems due to dementia.      [facility] by the jail. The history is limited by a language barrier and the absence of a caregiver.     Review of patient's allergies indicates:   Allergen Reactions    Pcn [penicillins] Hives and Other (See Comments)     unknow     Past Medical History:   Diagnosis Date    Basal cell carcinoma 04/20/2016    Scalp    DNR (do not resuscitate)     Hypertension     Hypothyroidism     Normocytic anemia 11/12/2016    Prostate cancer      Past Surgical History:   Procedure Laterality Date    BACK SURGERY      COCCYX AS  A CHILD    CATARACT EXTRACTION, BILATERAL      HERNIA REPAIR      X 2    PROSTATE SURGERY      PROSTATECTOMY      SKIN CANCER EXCISION      EAR RIGHT, LEFT JAW, AND RIGHT ARM     Family History   Problem Relation Age of Onset    Skin cancer Father     Stroke Sister 93    Anesthesia problems Neg Hx     Cancer Neg Hx     Clotting disorder Neg Hx      problems Neg Hx     Heart disease Neg Hx     Hypertension Neg Hx     Kidney cancer Neg Hx     Kidney disease Neg Hx     Malignant hyperthermia Neg Hx     Prostate cancer Neg Hx     Sickle cell trait Neg Hx     Lupus Neg Hx     Sudden death Neg Hx     Urolithiasis Neg Hx     Melanoma Neg Hx      Social History   Substance Use Topics    Smoking status: Never  Smoker    Smokeless tobacco: Never Used    Alcohol use No     Review of Systems   Unable to perform ROS: Dementia       Physical Exam     Initial Vitals   BP Pulse Resp Temp SpO2   -- -- -- -- --     Physical Exam    Nursing note and vitals reviewed.  Constitutional: He is not diaphoretic. No distress.   Chronically ill-appearing   HENT:   Head: Atraumatic.   Mouth/Throat: Oropharynx is clear and moist.   Eyes: Pupils are equal, round, and reactive to light.   Cardiovascular: Normal rate, regular rhythm, normal heart sounds and intact distal pulses.   Pulmonary/Chest: Breath sounds normal. No respiratory distress. He has no wheezes. He has no rales.   Abdominal: Soft. Bowel sounds are normal. He exhibits no distension. There is no tenderness. There is no rebound and no guarding.   Musculoskeletal: Normal range of motion.   There is full range of motion of the bilateral elbows.  There is full range of motion of the bilateral wrists.   Neurological: He is alert. No cranial nerve deficit.   Demented.  No focal deficits.   Skin: Capillary refill takes less than 2 seconds.   There are numerous skin tears along the bilateral elbows.  No deep lacerations requiring suturing.  There is a small skin tear of the left lateral lower leg.         ED Course   Procedures  Labs Reviewed - No data to display          Medical Decision Making:   Initial Assessment:   The patient had an unwitnessed fall.  He is at his neurologic baseline.  There is no evidence of head trauma on my evaluation.  He has full range motion of his elbows without bony tenderness or deformity.  We will dress his wounds and he will be sent back to his nursing home.                   ED Course     Clinical Impression:   The primary encounter diagnosis was Skin tear of elbow without complication, unspecified laterality, initial encounter. A diagnosis of Fall, initial encounter was also pertinent to this visit.                           Seth Rome,  MD  06/21/17 0152

## 2023-03-26 NOTE — PLAN OF CARE
Problem: Occupational Therapy Goal  Goal: Occupational Therapy Goal  Pt evaluated by OT and demonstrating significant confusion and inability to consistently follow commands.  Pts granddaughter indicating that Pt was unable to consistently follow her commands even in his native language.  Pt is inappropriate for OT until cognition improves enough to follow commands. Recommending D/C to [facility].  Outcome: Outcome(s) achieved Date Met:  01/13/17  Jonnie Burks OTR/L      1/13/2017                 1 Principal Discharge DX:	Medication refill